# Patient Record
Sex: MALE | Race: WHITE
[De-identification: names, ages, dates, MRNs, and addresses within clinical notes are randomized per-mention and may not be internally consistent; named-entity substitution may affect disease eponyms.]

---

## 2019-02-18 ENCOUNTER — HOSPITAL ENCOUNTER (OUTPATIENT)
Dept: HOSPITAL 84 - D.US | Age: 69
Discharge: HOME | End: 2019-02-18
Attending: GENERAL PRACTICE
Payer: MEDICARE

## 2019-02-18 DIAGNOSIS — E04.1: Primary | ICD-10-CM

## 2021-03-29 ENCOUNTER — HOSPITAL ENCOUNTER (INPATIENT)
Dept: HOSPITAL 84 - D.ER | Age: 71
LOS: 17 days | Discharge: NURSING, REHAB OR LONG-TERM CARE | DRG: 981 | End: 2021-04-15
Attending: FAMILY MEDICINE | Admitting: FAMILY MEDICINE
Payer: MEDICARE

## 2021-03-29 VITALS
BODY MASS INDEX: 20.5 KG/M2 | HEIGHT: 72 IN | WEIGHT: 151.32 LBS | WEIGHT: 151.32 LBS | BODY MASS INDEX: 20.5 KG/M2 | BODY MASS INDEX: 20.5 KG/M2 | BODY MASS INDEX: 20.5 KG/M2 | HEIGHT: 72 IN

## 2021-03-29 VITALS — SYSTOLIC BLOOD PRESSURE: 145 MMHG | DIASTOLIC BLOOD PRESSURE: 86 MMHG

## 2021-03-29 DIAGNOSIS — R53.1: ICD-10-CM

## 2021-03-29 DIAGNOSIS — N17.9: ICD-10-CM

## 2021-03-29 DIAGNOSIS — D68.318: ICD-10-CM

## 2021-03-29 DIAGNOSIS — I65.29: ICD-10-CM

## 2021-03-29 DIAGNOSIS — K29.00: ICD-10-CM

## 2021-03-29 DIAGNOSIS — E03.9: ICD-10-CM

## 2021-03-29 DIAGNOSIS — N40.0: ICD-10-CM

## 2021-03-29 DIAGNOSIS — G81.94: ICD-10-CM

## 2021-03-29 DIAGNOSIS — K62.5: ICD-10-CM

## 2021-03-29 DIAGNOSIS — I69.322: ICD-10-CM

## 2021-03-29 DIAGNOSIS — I10: ICD-10-CM

## 2021-03-29 DIAGNOSIS — I21.4: ICD-10-CM

## 2021-03-29 DIAGNOSIS — I63.511: Primary | ICD-10-CM

## 2021-03-29 DIAGNOSIS — G92: ICD-10-CM

## 2021-03-29 DIAGNOSIS — I69.318: ICD-10-CM

## 2021-03-29 DIAGNOSIS — T43.625A: ICD-10-CM

## 2021-03-29 DIAGNOSIS — F01.51: ICD-10-CM

## 2021-03-29 LAB
ALBUMIN SERPL-MCNC: 3.9 G/DL (ref 3.4–5)
ALP SERPL-CCNC: 138 U/L (ref 30–120)
ALT SERPL-CCNC: 17 U/L (ref 10–68)
AMPHETAMINES UR QL SCN: POSITIVE QUAL
ANION GAP SERPL CALC-SCNC: 10.3 MMOL/L (ref 8–16)
APTT BLD: 24.3 SECONDS (ref 22.8–39.4)
BACTERIA #/AREA URNS HPF: (no result) HPF
BARBITURATES UR QL SCN: NEGATIVE QUAL
BASOPHILS NFR BLD AUTO: 0.6 % (ref 0–2)
BENZODIAZ UR QL SCN: NEGATIVE QUAL
BILIRUB SERPL-MCNC: 0.66 MG/DL (ref 0.2–1.3)
BILIRUB SERPL-MCNC: NEGATIVE MG/DL
BUN SERPL-MCNC: 15 MG/DL (ref 7–18)
BZE UR QL SCN: NEGATIVE QUAL
CALCIUM SERPL-MCNC: 9 MG/DL (ref 8.5–10.1)
CANNABINOIDS UR QL SCN: NEGATIVE QUAL
CHLORIDE SERPL-SCNC: 102 MMOL/L (ref 98–107)
CK MB SERPL-MCNC: 2 U/L (ref 0–3.6)
CK SERPL-CCNC: 132 UL (ref 21–232)
CO2 SERPL-SCNC: 28.8 MMOL/L (ref 21–32)
CREAT SERPL-MCNC: 1.4 MG/DL (ref 0.6–1.3)
CRP SERPL-MCNC: 0 MG/DL (ref 0–0.9)
EOSINOPHIL NFR BLD: 1.2 % (ref 0–7)
ERYTHROCYTE [DISTWIDTH] IN BLOOD BY AUTOMATED COUNT: 13.6 % (ref 11.5–14.5)
GLOBULIN SER-MCNC: 4.6 G/L
GLUCOSE SERPL-MCNC: 106 MG/DL (ref 74–106)
HCT VFR BLD CALC: 40.4 % (ref 42–54)
HGB BLD-MCNC: 14 G/DL (ref 13.5–17.5)
IMM GRANULOCYTES NFR BLD: 0.2 % (ref 0–5)
INR PPP: 1.13 (ref 0.85–1.17)
KETONES UR STRIP-MCNC: (no result) MG/DL
LIPASE SERPL-CCNC: 71 U/L (ref 73–393)
LYMPHOCYTES # BLD: 0.73 10X3/UL (ref 1.32–3.57)
LYMPHOCYTES NFR BLD AUTO: 14.6 % (ref 15–50)
MAGNESIUM SERPL-MCNC: 2.3 MG/DL (ref 1.8–2.4)
MCH RBC QN AUTO: 29.3 PG (ref 26–34)
MCHC RBC AUTO-ENTMCNC: 34.7 G/DL (ref 31–37)
MCV RBC: 84.5 FL (ref 80–100)
MONOCYTES NFR BLD: 10.8 % (ref 2–11)
NEUTROPHILS # BLD: 3.62 10X3/UL (ref 1.78–5.38)
NEUTROPHILS NFR BLD AUTO: 72.6 % (ref 40–80)
NITRITE UR-MCNC: NEGATIVE MG/ML
NT-PROBNP SERPL-MCNC: 2002 PG/ML (ref 0–125)
OPIATES UR QL SCN: NEGATIVE QUAL
OSMOLALITY SERPL CALC.SUM OF ELEC: 274 MOSM/KG (ref 275–300)
PCP UR QL SCN: NEGATIVE QUAL
PH UR STRIP: 7 [PH] (ref 5–8)
PLATELET # BLD: 259 10X3/UL (ref 130–400)
PMV BLD AUTO: 9.4 FL (ref 7.4–10.4)
POTASSIUM SERPL-SCNC: 4.1 MMOL/L (ref 3.5–5.1)
PROT SERPL-MCNC: 8.5 G/DL (ref 6.4–8.2)
PROTHROMBIN TIME: 13.4 SECONDS (ref 11.6–15)
RBC # BLD AUTO: 4.78 10X6/UL (ref 4.2–6.1)
SODIUM SERPL-SCNC: 137 MMOL/L (ref 136–145)
SQUAMOUS #/AREA URNS HPF: (no result) HPF (ref 0–4)
TROPONIN I SERPL-MCNC: < 0.017 NG/ML (ref 0–0.06)
TSH SERPL-ACNC: 1.98 UIU/ML (ref 0.36–3.74)
UROBILINOGEN UR-MCNC: NORMAL MG/DL (ref ?–2)
WBC # BLD AUTO: 5 10X3/UL (ref 4.8–10.8)
WBC #/AREA URNS HPF: (no result) HPF (ref 0–1)

## 2021-03-29 NOTE — HEMODYNAMI
PATIENT:CHRIS HAYDEN                                      MEDICAL RECORD: [id number]
: 54                                            LOCATION:[facility]     ANYA.2214
ACCT# [id number]                                       ADMISSION DATE: 21
 
 
 Generatedon:3/31/426656:00
Patient name: CHRIS HAYDEN Patient #: [id number] Visit #: [id number] SSN: : 
1950 Date of study:
3/31/2021
Page: Of
Hemodynamic Procedure Report
****************************
Patient Data
Patient Demographics
Procedure consent was obtained
First Name: CHRIS           Gender: Male
Last Name: JACKELYN            : 1950
Patient #: [id number]       Age: 70 year(s)
Visit #: [id number]       Race: 
Accession #:
[id number]
Additional ID: [id number]
Contact details
Address: [place] [address]         Phone: [phone]
State: [place]
City: [place]
Zip code: [address]
Admission
Admission Data
Admission Date: 3/29/2021   Admission Time: 20:50
Arrival Date: 3/29/2021     Arrival Time: 20:50
Admit Source: Emergency     Insurance Payor: Medicare
department                  [facility] #: [id number]
Room #: D.2214
Height (in.): 71.65         BSA: 2 (m2)
Height (cm.): 182           BMI: 23.85 (kg/m2)
Weight (lbs.): 174.17
Weight (kg.): 79
Lab Results
Lab Result Date: 3/31/2021  Lab Result Time: 0:00
Biochemistry
Name         Units    Result                Min      Max
BUN          mg/dl    24       --(----)-*   7        18
Creatinine   mg/dl    1.3      --(---*)--   0.6      1.3
eGFR         ml/min   58       *-(----)--   90       120
NONAFRICAN
CBC
Name         Units    Result                Min      Max
Hemoglobin   g/dl     12.9     -*(----)--   13.5     17.5
Procedure
Procedure Types
Cath Procedure
Diagnostic Procedure
LHC
LH w/Coronaries
Sedation Charges
Moderate Sedation 25-39 minutes
PCI Procedure
Coronary Stent
 
Coronary Stent Initial x2
Hemochron ACT Test
Procedure Description
Procedure Date
Procedure Date: 3/31/2021
Procedure Start Time: 10:23
Procedure End Time: 10:55
Procedure Staff
Name                            Function
Oli Luevano MD              Performing Physician
Candida Herzog RT                    Monitor
Alycia Wen RT                Scrub
Calvin Melendez RN                  Nurse
Procedure Data
Cath Procedure
Fluoroscopy
Diagnostic fluoroscopy      Total fluoroscopy Time: 9.9
time: 9.9 min               min
Diagnostic fluoroscopy      Total fluoroscopy dose:
dose: 1033 mGy              1033 mGy
Contrast Material
Contrast Material Type                       Amount (ml)
Isovue 300                                   178
Entry Location
Entry     Primary  Successful  Side  Size  Upsize Upsize Entry    Closure     Sánchez
ccessful  Closure
Location                             (Fr)  1 (Fr) 2 (Fr) Remarks  Device        
          Remarks
Radial                         Right 6 Fr                         Mechanical
artery                               Short                        Compression
Estimated blood loss: 5 ml
Diagnostic catheters
Device Type               Used For           End Catheter
Placement
DIAGNOSTIC [place] 110cm 5  Multi-vessel
Fr catheter ([id number])      Angiography
Procedure Complications
No complications
Procedure Medications
Medication           Administration Route Dosage
Oxygen               etCO2 Nasal cannula  2 l/min
Lidocaine 2%                              20
Heparin Flush Bag    added to field       2 bags
(1000units/500ml NS)
0.9% NaCl            I.V.                 100 ml/hr
Versed               I.V.                 0.5 mg
Fentanyl             I.V.                 25 mcg
Radial Cocktail      I.A.                 1 syringe
(Verapamil 2mg/Nitro
400mcg/Heparin
1500units)
Versed               I.V.                 0.5 mg
Fentanyl             I.V.                 25 mcg
Heparin Bolus        I.V.                 5000 units
Integrilin (Bolus    I.V.                 7.3 ml
2mg/ml)
Integrilin Drip      I.V. drip            12.6 ml/hr
(75mg/100ml)
Vasotec                                   2.5 mg
Hemodynamics
 
Rest
BSA: 2 (m2) HGB: 12.9 (g/dl) O2 Consumption: Estimated: 236.92 (ml/min) O2 Consu
mption indexed:
Estimated:118.46 (ml/min/m) Heart Rate: 76 (bpm)
Pressure Samples
Time     Site     Value (mmHg) Purpose      Heart      Use
Rate(bpm)
10:25    LV       100/8,10     Snapshot     72
10:26    AO       96/66(79)    Pullback     70
10:26    LV       96/5,10      Pullback     70
Gradients
Valve  Time  Site 1  Site 2    Mean    SEP/DFP    Peak To Heart  Use
(mmHg)  (sec/min)  Peak    Rate
(mmHg)  (bpm)
Aortic 10:26 LV      AO        0       1          0       70
96/5,10 96/66(79)
Calculations
Valve    P-P      Mean      Valve     Index  Valve    Source
Name              Gradient  Area             Flow
(cm2)
Aortic   0        0
0        0
Snapshots
Pre Cath      Intra         NCS           Post Cath
Vital Signs
Time     Heart  Resp   SPO2 etCO2   NIBP (mmHg)  Rhythm  Pain    Sedation
Rate   (ipm)  (%)  (mmHg)                       Status  Level
(bpm)
10:15:35 77     14     98   1.5     154/98(123)  NSR     0 (11)  8(A)
, No
pain
10:19:53 76     13     99   0.7     151/93(121)  NSR     0 (11)  8(A)
, No
pain
10:24:11 75     14     99   1.5     139/82(109)  NSR     0 (11)  8(A)
, No
pain
10:28:23 79     13     98   12.1    124/81(95)   NSR     0 (11)  8(A)
, No
pain
10:32:35 76     12     98   13.6    135/84(106)  NSR     0 (11)  8(A)
, No
pain
10:36:51 77     14     99   18.9    146/88(110)  NSR     0 (11)  8(A)
, No
pain
10:41:07 74     13     99   15.9    152/91(122)  NSR     0 (11)  8(A)
, No
pain
10:45:27 73     13     99   1.5     156/90(126)  NSR     0 (11)  8(A)
, No
pain
10:49:49 74     14     99   9.8     165/98(127)  NSR     0 (11)  8(A)
, No
pain
10:54:15 75     16     100  9.1     170/105(143) NSR     0 (11)  8(A)
, No
pain
Medications
Time     Medication       Route   Dose    Verified Delivered Reason          Not
 
es        Effectiveness
by       by
10:15:28 Oxygen           etCO2   2 l/min Oli Simpson    used for
Nasal           St Samson Melendez RN   procedure
cannula         MD
10:16:06 Lidocaine 2%             20ml    Oli Baird   for local
vial    [facility]   anesthetic
MD CASTILLO
10:16:12 Heparin Flush    added   2 bags  Oli Baird   used for
Bag              to              [facility]   procedure
(1000units/500ml field MD CASTILLO
NS)
10:16:21 0.9% NaCl        I.V.    100     Oli Simpson    Per physician
ml/hr   St Samson Melendez RN, MD
10:16:42 Versed           I.V.    0.5 mg  Oli Simpson    for sedation
St Samson Melendez RN, MD
10:16:53 Fentanyl         I.V.    25 mcg  Oli Simpson    for sedation
St Samson Melendez RN, MD
10:23:28 Radial Cocktail  I.A.    1       Oli Baird   for
(Verapamil               syringe ChloéSamson Luevano   vasodilation
2mg/Nitro                        MD CASTILLO
400mcg/Heparin
1500units)
10:23:40 Versed           I.V.    0.5 mg  Oli Baird   for sedation
St Samson Davies MD, MD
10:23:43 Fentanyl         I.V.    25 mcg  Oli Baird   for sedation
St Samson Davies MD, MD
10:31:22 Heparin Bolus    I.V.    5000    Oli Simpson    for             lauren
ified
units   St Samson Melendez RN   anticoagulation with dr MD hinojosa
10:33:03 Integrilin       I.V.    7.3 ml  Oli Simpson    for             was
irwin 2.7
(Bolus 2mg/ml)                   St Samson Melendez RN   antiplatelet    ml of vial
MD                 therapy
10:51:26 Integrilin Drip  I.V.    12.6    Oli laguna             To 
be
(75mg/100ml)     drip    ml/hr   St Samson Melendez RN   antiplatelet    infusing
MD                 therapy         until
cleared to
take
antiplatelet
by mouth.
10:51:55 Vasotec          IV      2.5 mg  Oli iSmpson    Per physician
St Samson Melendez RN, MD
Procedure Log
Time     Note
9:41:27  Diagnostic Cath Status : Urgent
9:42:00  Informed consent obtained and on chart
9:42:12  Admit Source: Emergency department
9:42:17  Arrival Date: 3/29/2021 8:50:00 PM
9:42:43  Insurance Payor : Medicare
9:42:46  Patient Height : 71.65 inches
9:42:50  Patient Weight : 174.17 lbs
 
9:43:44  Lab Result : eGFR NONAFRICAN 58 ml/min
9:43:44  Lab Result : Creatinine 1.3 mg/dl
9:43:44  Lab Result : BUN 24 mg/dl
9:43:44  Lab Result : Hemoglobin 12.9 g/dl
9:43:52  Procedure Status Urgent Heart Cath (IP).
9:43:58  Calvin Melendez RN sent for patient. Start room use.
9:44:00  Time tracking: Regular hours (M-F 7:00 - 5:00)
9:44:04  Plan of Care:Hemodynamics will remain stable., Cardiac
rhythm will remain stable., Comfort level will be
maintained., Respiratory function will remain
adequate., Patient/ family verbilizes understanding of
procedure., Procedure tolerated without complication.,
Recovers from procedure without complications..
10:01:37 Patient received from Med/Surg to CCL 1 Alert and
oriented. Tansferred to table in Supine position.
10:01:38 Warm blankets applied, and yousuf hugger turned on for
patient comfort.
10:01:39 Correct patient and procedure confirmed by team.
10:01:39 ECG and BP/O2 sat monitors applied to patient.
10:14:27 Baseline sample Acquired.
10:14:27 Vital chart was started
10:14:38 Full Disclosure recording started
10:14:42 H&P Date Dictated: 3/31/2021 New H&P dictated by
physician..
10:14:44 Pre-procedure instructions explained to patient.
10:14:44 Pre-op teaching completed and patient verbalized
understanding.
10:14:48 Family in patients room.
10:14:49 Patient NPO since Midnight.
10:15:28 Oxygen 2 l/min etCO2 Nasal cannula was administered by
Calvin Melendez RN; used for procedure; Verbal order read
back and verified.
10:15:36 Is the patient allergic to Iodine/contrast media? No.
10:15:36 Was the patient premedicated? Yes
10:15:37 Is patient on blood thinner?No
10:15:39 Patient diabetic? Unknown.
10:15:43 Previous problem with sedation/anesthesia? Unknown ?
10:15:45 Snore? Unknown
10:15:47 Sleep apnea? Unknown
10:15:48 Deviated septum? Unknown
10:15:52 Opens mouth fully? Unknown
10:15:53 Sticks out tongue? Unknown
10:15:55 Airway obstruction? Unknown ?
10:15:58 Dentures? Unknown ?
10:16:03 Pre procedure: right dorsailis pedis pulse 1+
Palpable, but thready & weak; easily obliterated
10:16:06 Lidocaine 2% 20ml vial was administered by Oli Luevano MD; for local anesthetic; Verbal order read back
and verified.
10:16:06 Pre procedure: left dorsailis pedis pulse 1+ Palpable,
but thready & weak; easily obliterated
10:16:08 Patient pain scale 0/10 ?.
10:16:12 Heparin Flush Bag (1000units/500ml NS) 2 bags added to
field was administered by Oli Luevano MD; used for
procedure; Verbal order read back and verified.
10:16:14 IV patent on arrival in right forearm with 0.9% NaCl
at [facility].
10:16:15 Lab results completed and on chart.
10:16:20 Right Radial & Right Groin area was prepped with
chlora-prep and draped in sterile fashion
 
10:16:21 0.9% NaCl 100 ml/hr I.V. was administered by Calvin Melendez RN; Per physician; Verbal order read back and
verified.
10:16:21 Alarms reviewed by R. N.
10:16:22 Sharps counted by scrub and verified by R.N.
10:16:23 Physician arrived
10:16:23 Final Timeout: patient, procedure, and site verified
with staff and physician. All members of the team are
in agreement.
10:16:24 --------ALL STOP TIME OUT------
10:16:26 Right Radial & Right Groin site verified by team.
10:16:30 Fire Safety Assessment: A--An alcohol-based skin
anteseptic being used preoperatively., C--Open oxygen
or nitrous oxide is being used., D--An ESU, laser, or
fiber-optic light is being used.
10:16:34 Physical assessment completed. ASA score P 3 - A
patient with severe systemic disease as per Oli Luevano MD.
10:16:42 Versed 0.5 mg I.V. was administered by Calvin Melendez RN;
for sedation; Verbal order read back and verified.
10:16:53 Fentanyl 25 mcg I.V. was administered by Calvin Melendez RN; for sedation; Verbal order read back and verified.
10:17:13 3a) 45-59 Moderately reduced kidney function.
10:17:16 Maximum allowable contrast dose (3.7 X eGFR X 0.75)160
ml.
10:17:20 Sedation plan: IV Moderate Sedation Medication:Versed,
Fentanyl
10:17:30 Use device set Radial Dx or PCI
10:17:31 ACIST Syringe (91320) opened to sterile field.
10:17:31 Medline Cath Pack ([id number]) opened to sterile field.
10:17:32 Bag Decanter (S) opened to sterile field.
10:17:32 ACIST Hand Control (77602) opened to sterile field.
10:17:33 ACIST Manifold (81677) opened to sterile field.
10:17:34 Tegaderm 4 x 4 (1626W) opened to sterile field.
10:17:35 MBrace Wrist Support ([id number]) opened to sterile
field.
10:17:36 SHEATH 6FR RAIN ([id number]) opened to sterile field.
10:17:37 EMERALD Guide Wire (768-198) opened to sterile field.
10:18:23 Family of pt was in room upon transfer to cath lab. Pt
was non responisive to verbal and is a fall risk.
10:23:28 Radial Cocktail (Verapamil 2mg/Nitro 400mcg/Heparin
1500units) 1 syringe I.A. was administered by Oli Luevano MD; for vasodilation; Verbal order read back
and verified.
10:23:39 Procedure started.
10:23:40 Versed 0.5 mg I.V. was administered by Oli Luevano MD; for sedation; Verbal order read back and verified.
10:23:42 Local anesthetic to right radial artery with Lidocaine
2% by Oli Luevano MD.**INITIAL ACCESS ONLY**
10:23:43 Fentanyl 25 mcg I.V. was administered by Oli Luevano MD; for sedation; Verbal order read back and
verified.
10:23:51 A 6 Fr Short sheath was inserted into the Right Radial
artery
10:24:00 A DIAGNOSTIC Tiger 110cm 5 Fr catheter ([id number]) was
advanced over the wire and used for Multi-vessel
Angiography.
10:25:46 LV hemodynamics recorded.
10:25:47 LV gram done using SARKAR
10:25:49 Injector settings: Ml/sec: 5, Volume: 15,
 
10:25:56 EF : 55 %
10:25:59 RCA angiography performed.
10:26:01 Injector settings: Ml/sec: 3, Volume: 6,
10:26:35 LCA angiography performed.
10:26:38 Injector settings: Ml/sec: 3, Volume: 6,
10:28:55 Catheter removed.
10:29:44 Asahi Minamo 300cm wire opened to sterile field.
10:29:45 INFLATOR Merit BasixCompak (VC4293) opened to sterile
field.
10:29:45 GUIDE 6FR XBLAD 3.5 catheter ([id number]) opened to
sterile field.
10:29:52 INFLATOR Merit BasixCompak (BY8802) opened to sterile
field.
10:31:22 Heparin Bolus 5000 units I.V. was administered by
Calvin Melendez RN; for anticoagulation; verified with dr hinojosa Verbal order read back and verified.
10:31:34 Proceeding to intervention.
10:31:42 6 Fr XBLAD 3.5 guide catheter was inserted over the
wire
10:31:46 MINAMO wire advanced.
10:33:03 Integrilin (Bolus 2mg/ml) 7.3 ml I.V. was administered
by Calvin Melendez RN; for antiplatelet therapy; wasted
2.7 ml of vial Verbal order read back and verified.
10:34:08 Wire advanced across lesion.
10:36:39 Place stent Inflation Number: 1 A INTEGRITY RX 3.5 x
15 stent ([id number]) was prepped and advanced across
the Mid CX 90. The stent was deployed at 14 ERIKA for
0:30 (min:sec) 0.
10:37:32 Stent catheter was removed intact over wire.
10:39:46 Place stent Inflation Number: 1 A INTEGRITY RX 3.5 x
15 stent ([id number]) was prepped and advanced across
the Prox CX 90. The stent was deployed at 14 ERIKA for
0:30 (min:sec) 0.
10:42:27 Stent catheter was removed intact over wire.
10:42:33 Wire redirected to LAD.
10:45:26 Inflate balloon Inflation number: 1 A EUPHORA 3.0 x 15
Balloon ([id number]) was prepped and advanced across the
Mid LAD 90, then inflated to 14 ERIKA for 0:30 (min:sec)
.
10:45:46 Inflation number: 2 The EUPHORA 3.0 x 15 Balloon
([id number]) was reinflated across the Mid LAD , to 14
ERIKA for 0:30 (min:sec) .
10:46:14 Inflation number: 3 The EUPHORA 3.0 x 15 Balloon
([id number]) was reinflated across the Mid LAD , to 14
ERIKA for 0:30 (min:sec) .
10:46:35 Inflation number: 1 The EUPHORA 3.0 x 15 Balloon
([id number]) was reinflated across the Prox LAD 90, to
14 ERIKA for 0:30 (min:sec) .
10:47:09 Inflation number: 2 The EUPHORA 3.0 x 15 Balloon
([id number]) was reinflated across the Prox LAD , to 14
ERIKA for 0:30 (min:sec) .
10:47:49 Inflation number: 3 The EUPHORA 3.0 x 15 Balloon
([id number]) was reinflated across the Prox LAD , to 16
ERIKA for 0:30 (min:sec) .
10:48:28 Balloon removed over the wire.
10:51:26 Integrilin Drip (75mg/100ml) 12.6 ml/hr I.V. drip was
administered by Calvin Melendez RN; for antiplatelet
therapy; To be infusing until cleared to take
antiplatelet by mouth. Verbal order read back and
verified.
 
10:51:35 Place stent Inflation Number: 4 A INTEGRITY RX 3.0 x
18 stent ([id number]) was prepped and advanced across
the Mid LAD 90. The stent was deployed at 16 ERIKA for
0:30 (min:sec) 0.
10:51:51 Stent catheter was removed intact over wire.
10:51:53 Wire removed.
10:51:54 Guide catheter removed.
10:51:55 Vasotec 2.5 mg IV was administered by Calvin Melendez RN;
Per physician; Verbal order read back and verified.
10:52:05 ZEPHYR REGULAR TR BAND ([id number]) opened to sterile
field.
10:52:30 Sheath removed intact; hemostasis achieved with
Mechanical Compression to the Right Radial artery.
10:52:35 Procedure ended.(Physican Out)
10:53:01 Fluoroscopy time 09.90 minutes.
10:53:05 Flurop Dose total: 1033
10:53:05 Fluoroscopy dose: 1033 mGy
10:53:10 Dose Area Product 58704 mGy/cm.
10:53:14 Contrast amount:Isovue 300 178ml.
10:53:17 Maximum allowable dose exceeded? Yes.
10:53:20 Pickens band inflated with 12cc of air.
10:53:27 Post right radial artery:stable
10:53:28 Post Procedure Pulses reassessed and unchanged
10:53:31 Post procedure rhythm: unchanged.
10:53:34 Estimated blood loss: 5 ml
10:53:36 Post procedure instruction explained to
patient.Patient verbalizes understanding.
10:53:36 Patient needs reinforcement of post procedure
teaching.
10:55:19 Procedure type changed to Cath procedure, Diagnostic
procedure, LHC, [facility] w/Coronaries, Sedation Charges,
Moderate Sedation 25-39 minutes, PCI procedure,
Coronary Stent, Coronary Stent Initial x2, Hemochron
ACT Test
10:55:21 Procedure and supply charges have been captured,
reviewed, submitted and are correct.
10:55:25 Procedure Complication : No complications
10:55:28 Vital chart was stopped
10:55:29 [facility] Findings: MVD- PCI performed (see procedure note)
10:55:32 Operative report dictated upon procedure completion.
10:55:32 See physician's report for complete and final results.
10:55:42 Report given to Med/Surg.
10:55:45 Patient transfered to Med/Surg with Stretcher.
10:55:47 Procedure ended.
10:55:47 Full Disclosure recording stopped
10:55:57 End room use (Document Last)
10:59:43 ACT drawn and resulted at out of range high seconds.
(normal therapeutic range 180-240 seconds).
Intervention Summary
Intervention Notes
Time     ActionType  Lesion and  Equipment    Action#  Pressure  Duration
Attributes  Used
10:36:39 Place stent Mid CX      INTEGRITY RX 1        14        00:30
3.5 x 15
stent
([id number])
10:39:46 Place stent Prox CX     INTEGRITY RX 1        14        00:30
3.5 x 15
stent
([id number])
 
10:45:26 Inflate     Mid LAD     EUPHORA 3.0  1        14        00:30
balloon                 x 15 Balloon
([id number])
10:45:46 Reinflate   Mid LAD     EUPHORA 3.0  2        14        00:30
balloon                 x 15 Balloon
([id number])
10:46:14 Reinflate   Mid LAD     EUPHORA 3.0  3        14        00:30
balloon                 x 15 Balloon
([id number])
10:46:35 Reinflate   Prox LAD    EUPHORA 3.0  1        14        00:30
balloon                 x 15 Balloon
([id number])
10:47:09 Reinflate   Prox LAD    EUPHORA 3.0  2        14        00:30
balloon                 x 15 Balloon
([id number])
10:47:49 Reinflate   Prox LAD    EUPHORA 3.0  3        16        00:30
balloon                 x 15 Balloon
([id number])
10:51:35 Place stent Mid LAD     INTEGRITY RX 4        16        00:30
3.0 x 18
stent
([id number])
Device Usage
Item Name    Manufacture  Quantity  Catalog      Hospital Part     Current Minim
al Lot# /
Number       Charge   Number   Stock   Stock   Serial#
Code
ACIST        Acist        1         25102        924252   065174   947349  20
Syringe      Medical
(47958)      Systems Inc
Medline Cath Medline      1         [id number]    933202   69465    974826  5
Pack
([id number])
Bag Decanter Microtek     1         S        594943   05590    449502  5
(S)      Medical Inc.
ACIST Hand   Acist        1         13620        964522   922471   990100  5
Control      Medical
(97570)      Systems Inc
ACIST        Acist        1         88961        267123   252930   382051  5
Manifold     Medical
(78504)      Systems Inc
Tegaderm 4 x 3M           1         1626W        347909   743206   308219  5
4 (1626W)
MBrace Wrist Advanced     1         [phone]  299533   96428    448356  5
Support      Vascular
(810733655)  Dynamics
SHEATH 6FR   Cardinal     1         8712953      203109   1001651  506843  5
[facility]
(8233711)
EMERALD      Cardinal     1         729-881      103405   826045   634722  5
Guide Wire   [facility]
(875-340)
DIAGNOSTIC   Terumo       1               637578   868188   721841  5
[place] 110cm
5 Fr
catheter
(716133)
[facility] Intecc 1         [id number]    333986   0047767  688418  0
300cm wire
INFLATOR     Merit        2         [id number]       738584   483342   336509  15
 
Merit        Medical
BasixCompak
(YT7194)
GUIDE 6FR    Cardinal     1         [id number]     150792   354884   674878  10
XBLAD 3.5    Health
catheter
(25875579)
INTEGRITY RX Medtronic    2         [id number]   489413   002065   077935  5    
   [id number]
3.5 x 15                                                                        
   [id number]
stent
([id number])
EUPHORA 3.0  Medtronic    1         [id number]     324638   832433   230770  5    
   [id number]
x 15 Balloon
([id number])
INTEGRITY RX Medtronic    1         [id number]   555416   842643   828150  5    
   [id number]
3.0 x 18
stent
([id number])
ZEPHYR       Cardinal     1         394015       656755   8237976  722220  5
REGULAR TR   Health
BAND
(734254)
Signature Audit [place]
Stage           Time        Signature      Unsigned
Intra-Procedure 3/31/2021   Candida Herzog
10:57:12 AM RT(R)
Intra-Procedure 3/31/2021   Calvin Melendez RN
10:57:30 AM
Intra-Procedure 3/31/2021   Oli Nuñez
11:00:23 AM Samson CASTILLO
 
 
 
 
 
 
 
 
 
 
 
 
 
 
 
 
 
 
 
 
 
[facility]                                          
[address] [id number]

## 2021-03-30 VITALS — SYSTOLIC BLOOD PRESSURE: 158 MMHG | DIASTOLIC BLOOD PRESSURE: 89 MMHG

## 2021-03-30 VITALS — SYSTOLIC BLOOD PRESSURE: 144 MMHG | DIASTOLIC BLOOD PRESSURE: 76 MMHG

## 2021-03-30 VITALS — DIASTOLIC BLOOD PRESSURE: 100 MMHG | SYSTOLIC BLOOD PRESSURE: 221 MMHG

## 2021-03-30 VITALS — DIASTOLIC BLOOD PRESSURE: 78 MMHG | SYSTOLIC BLOOD PRESSURE: 131 MMHG

## 2021-03-30 VITALS — DIASTOLIC BLOOD PRESSURE: 61 MMHG | SYSTOLIC BLOOD PRESSURE: 116 MMHG

## 2021-03-30 VITALS — SYSTOLIC BLOOD PRESSURE: 187 MMHG | DIASTOLIC BLOOD PRESSURE: 97 MMHG

## 2021-03-30 LAB
ANION GAP SERPL CALC-SCNC: 13.9 MMOL/L (ref 8–16)
BASOPHILS NFR BLD AUTO: 0.8 % (ref 0–2)
BUN SERPL-MCNC: 16 MG/DL (ref 7–18)
CALCIUM SERPL-MCNC: 8.8 MG/DL (ref 8.5–10.1)
CHLORIDE SERPL-SCNC: 104 MMOL/L (ref 98–107)
CHOLEST/HDLC SERPL: 3.3 RATIO (ref 2.3–4.9)
CK MB SERPL-MCNC: 16.9 U/L (ref 0–3.6)
CK MB SERPL-MCNC: 6.4 U/L (ref 0–3.6)
CK SERPL-CCNC: 140 UL (ref 21–232)
CK SERPL-CCNC: 205 UL (ref 21–232)
CO2 SERPL-SCNC: 24.9 MMOL/L (ref 21–32)
CREAT SERPL-MCNC: 1.2 MG/DL (ref 0.6–1.3)
EOSINOPHIL NFR BLD: 1.4 % (ref 0–7)
ERYTHROCYTE [DISTWIDTH] IN BLOOD BY AUTOMATED COUNT: 13.5 % (ref 11.5–14.5)
GLUCOSE SERPL-MCNC: 91 MG/DL (ref 74–106)
HCT VFR BLD CALC: 37.9 % (ref 42–54)
HDLC SERPL-MCNC: 50 MG/DL (ref 32–96)
HGB BLD-MCNC: 12.9 G/DL (ref 13.5–17.5)
IMM GRANULOCYTES NFR BLD: 0.2 % (ref 0–5)
LDL-HDL RATIO: 2.1 RATIO (ref 1.5–3.5)
LDLC SERPL-MCNC: 104 MG/DL (ref 0–100)
LYMPHOCYTES # BLD: 0.86 10X3/UL (ref 1.32–3.57)
LYMPHOCYTES NFR BLD AUTO: 17.1 % (ref 15–50)
MAGNESIUM SERPL-MCNC: 2.2 MG/DL (ref 1.8–2.4)
MCH RBC QN AUTO: 28.9 PG (ref 26–34)
MCHC RBC AUTO-ENTMCNC: 34 G/DL (ref 31–37)
MCV RBC: 85 FL (ref 80–100)
MONOCYTES NFR BLD: 13.5 % (ref 2–11)
NEUTROPHILS # BLD: 3.37 10X3/UL (ref 1.78–5.38)
NEUTROPHILS NFR BLD AUTO: 67 % (ref 40–80)
OSMOLALITY SERPL CALC.SUM OF ELEC: 278 MOSM/KG (ref 275–300)
PHOSPHATE SERPL-MCNC: 3.6 MG/DL (ref 2.5–4.9)
PLATELET # BLD: 272 10X3/UL (ref 130–400)
PMV BLD AUTO: 9.5 FL (ref 7.4–10.4)
POTASSIUM SERPL-SCNC: 3.8 MMOL/L (ref 3.5–5.1)
RBC # BLD AUTO: 4.46 10X6/UL (ref 4.2–6.1)
SODIUM SERPL-SCNC: 139 MMOL/L (ref 136–145)
TRIGL SERPL-MCNC: 56 MG/DL (ref 30–200)
TROPONIN I SERPL-MCNC: 0.82 NG/ML (ref 0–0.06)
TROPONIN I SERPL-MCNC: 4.5 NG/ML (ref 0–0.06)
WBC # BLD AUTO: 5 10X3/UL (ref 4.8–10.8)

## 2021-03-30 NOTE — NUR
PATIENT AND SISTER UPSET THAT PATIENT HAS NOT HAD MRI AND HAS NOT SEEN SPEECH
THERAPIST. MRI WAS UNABLE TO BE PERFORMED THIS AM D/T PATIENT WOULD NOT
COOPERATE. MRI NOW HAS TO FIT PATIENT INTO SCHEDULE LATER THIS AFTERNOON D/T
NO OPEN TIME SLOTS RIGHT NOW. PATIENT AND SISTER HAVE BEEN MADE AWARE OF THIS.
SPOKE WITH RAFFAELE FROM SPEECH THERAPY WHO STATES PATIENT IS ON HIS LIST TO BE
SEEN BUT ALSO HAS SEVERAL OTHER PATIENT'S TO SEE. PATIENT AND SISTER MADE
AWARE.

## 2021-03-30 NOTE — NUR
NURSE RECEIVED PHONE CALL BACK FROM EDGAR FLORES. INFORMED ABOUT LAB
LEVELS. ORDERED TO KEEP NPO TONIGHT AND CARDIO WILL SEE IN THE MORNING.

## 2021-03-30 NOTE — NUR
LING NEWMAN MADE AWARE OF PATIENT /120. PRN APRESOLINE GIVEN. ORDERS
PLACED BY APN FOR HOME MEDICATIONS AND PRN MEDICATIONS.

## 2021-03-30 NOTE — NUR
REHAB PRESCREEN:
THANK Y0U FOR THE REFERRAL. PT STILL HAS PT AND ST EVALUATIONS PENDING AT
THIS TIME. MRI IS STILL PENDING AS WELL. REHAB WILL CONTINUE TO FOLLOW FOR NOW
UNTIL WE HAVE FURTHER INFORMATION THAT WILL ASSIST IN DETERMINATION.
 
BIB BURGER RN
[facility].

## 2021-03-30 NOTE — NUR
SPOKE WITH LING FERRIS ABOUT ONE TIME DOSE ATIVAN. STATES TO LEAVE ON MAR
UNTIL TIME FOR PATIENT TO GO TO MRI. GIVE BEFORE MRI.

## 2021-03-30 NOTE — NUR
ALERT AND ORIENTED TO SELF AND PLACE. ASSESSMENT COMPLETE. DENIES NEEDS.
OFFERED PATIENT EGG CRATE MATTRESS D/T PATIENT STATING BED IS UNCOMFORTABLE
AND CAN'T SLEEP. REFUSES EGG CRATE. REFUSES SCDS. BED LOW. BED ALARM ON. CALL
FARFAN AND PERSONAL ITEMS IN REACH. WILL CONTINUE TO MONITOR.

## 2021-03-30 NOTE — NUR
PATIENT REFUSING MRI. ORDER FOR ONE TIME DOSE ATIVAN PLACED BY LING FERRIS
BUT PATIENT BACK IN ROOM NOW. MRI STATES WILL CALL WHEN ATIVAN NEEDS TO BE
GIVEN.

## 2021-03-30 NOTE — NUR
NURSE SAW [facility] WENT FROM 0.816 TO 4.504. PT CURRENTLY RESTING IN BED
WITH FAMILY FRIEND AT BEDSIDE. CALLED ON CALL HOSPITALIST TO NOTIFY. WAS GIVEN
ORDER TO CONSULT CARDIOLOGY TONIGHT. PAGED ON CALL CARDIO AT [phone]. WAITING ON
RETURNED PHONE CALL.

## 2021-03-30 NOTE — NUR
ADDITION TO PREVIOUS NOTE. PATIENT'S SISTER ALSO STATING THAT PATIENT WANTS TO
LEAVE. MADE AWARE THAT IF PATIENT LEAVES, INSURANCE WILL NOT COVER STAY.

## 2021-03-30 NOTE — NUR
PATIENT'S SISTER AT DESK REQUESTING TO TALK TO HOUSE SUPERVISOR. HOUSE SUP
CALLED AND STATES WILL COME TALK WITH PATIENT AND FAMILY.

## 2021-03-30 NOTE — NUR
PATIENT'S SISTER OUT AT DESK UPSET AND WANTING TO TALK TO APN. LING FERRIS ON
FLOOD MADE AWARE AND STATES THAT SHE WENT IN ROOM ALREADY AND BOTH PATIENT AND
SISTER WERE SLEEPING SO SHE WILL HAVE TO GO BACK AND SEE THEM WHEN SHE CAN.
PATIENT'S SISTER MADE AWARE.

## 2021-03-30 NOTE — NUR
PATIENT ASSISTED UP TO CHAIR D/T HURTING. PATIENT STATES WANTS ATIVAN NOW.
CANNOT HAVE ATIVAN UNTIL TIME FOR MRI. STATES "YALL AREN'T DOING ANYTHING TO
HELP ME. I'VE GOTTEN WORSE SINCE I GOT HERE." ABLE TO GET PATIENT TO TAKE
LISINOPRIL BUT REFUSES ALL OTHER MEDICATIONS AT THIS TIME. WILL TRY AGAIN.
PATIENT'S SISTER IN ROOM. CHAIR ALARM ON.

## 2021-03-31 VITALS — SYSTOLIC BLOOD PRESSURE: 154 MMHG | DIASTOLIC BLOOD PRESSURE: 90 MMHG

## 2021-03-31 VITALS — DIASTOLIC BLOOD PRESSURE: 96 MMHG | SYSTOLIC BLOOD PRESSURE: 157 MMHG

## 2021-03-31 VITALS — SYSTOLIC BLOOD PRESSURE: 210 MMHG | DIASTOLIC BLOOD PRESSURE: 104 MMHG

## 2021-03-31 VITALS — DIASTOLIC BLOOD PRESSURE: 73 MMHG | SYSTOLIC BLOOD PRESSURE: 148 MMHG

## 2021-03-31 VITALS — DIASTOLIC BLOOD PRESSURE: 111 MMHG | SYSTOLIC BLOOD PRESSURE: 181 MMHG

## 2021-03-31 VITALS — SYSTOLIC BLOOD PRESSURE: 179 MMHG | DIASTOLIC BLOOD PRESSURE: 98 MMHG

## 2021-03-31 VITALS — SYSTOLIC BLOOD PRESSURE: 148 MMHG | DIASTOLIC BLOOD PRESSURE: 79 MMHG

## 2021-03-31 VITALS — SYSTOLIC BLOOD PRESSURE: 161 MMHG | DIASTOLIC BLOOD PRESSURE: 94 MMHG

## 2021-03-31 VITALS — DIASTOLIC BLOOD PRESSURE: 78 MMHG | SYSTOLIC BLOOD PRESSURE: 158 MMHG

## 2021-03-31 VITALS — DIASTOLIC BLOOD PRESSURE: 79 MMHG | SYSTOLIC BLOOD PRESSURE: 132 MMHG

## 2021-03-31 VITALS — SYSTOLIC BLOOD PRESSURE: 188 MMHG | DIASTOLIC BLOOD PRESSURE: 90 MMHG

## 2021-03-31 VITALS — DIASTOLIC BLOOD PRESSURE: 112 MMHG | SYSTOLIC BLOOD PRESSURE: 191 MMHG

## 2021-03-31 VITALS — DIASTOLIC BLOOD PRESSURE: 100 MMHG | SYSTOLIC BLOOD PRESSURE: 194 MMHG

## 2021-03-31 LAB
ALT SERPL-CCNC: 21 U/L (ref 10–68)
ANION GAP SERPL CALC-SCNC: 13.9 MMOL/L (ref 8–16)
BASOPHILS NFR BLD AUTO: 0.2 % (ref 0–2)
BUN SERPL-MCNC: 24 MG/DL (ref 7–18)
CALCIUM SERPL-MCNC: 8.8 MG/DL (ref 8.5–10.1)
CHLORIDE SERPL-SCNC: 105 MMOL/L (ref 98–107)
CHOLEST/HDLC SERPL: 2.9 RATIO (ref 2.3–4.9)
CK MB SERPL-MCNC: 16.1 U/L (ref 0–3.6)
CK SERPL-CCNC: 215 UL (ref 21–232)
CO2 SERPL-SCNC: 24.1 MMOL/L (ref 21–32)
CREAT SERPL-MCNC: 1.3 MG/DL (ref 0.6–1.3)
EOSINOPHIL NFR BLD: 0.4 % (ref 0–7)
ERYTHROCYTE [DISTWIDTH] IN BLOOD BY AUTOMATED COUNT: 13.7 % (ref 11.5–14.5)
GLUCOSE SERPL-MCNC: 74 MG/DL (ref 74–106)
HCT VFR BLD CALC: 38.5 % (ref 42–54)
HDLC SERPL-MCNC: 52 MG/DL (ref 32–96)
HGB BLD-MCNC: 12.9 G/DL (ref 13.5–17.5)
IMM GRANULOCYTES NFR BLD: 0.2 % (ref 0–5)
LDL-HDL RATIO: 1.8 RATIO (ref 1.5–3.5)
LDLC SERPL-MCNC: 91 MG/DL (ref 0–100)
LYMPHOCYTES # BLD: 0.51 10X3/UL (ref 1.32–3.57)
LYMPHOCYTES NFR BLD AUTO: 9.2 % (ref 15–50)
MAGNESIUM SERPL-MCNC: 2.3 MG/DL (ref 1.8–2.4)
MCH RBC QN AUTO: 28.7 PG (ref 26–34)
MCHC RBC AUTO-ENTMCNC: 33.5 G/DL (ref 31–37)
MCV RBC: 85.7 FL (ref 80–100)
MONOCYTES NFR BLD: 8 % (ref 2–11)
NEUTROPHILS # BLD: 4.53 10X3/UL (ref 1.78–5.38)
NEUTROPHILS NFR BLD AUTO: 82 % (ref 40–80)
OSMOLALITY SERPL CALC.SUM OF ELEC: 280 MOSM/KG (ref 275–300)
PHOSPHATE SERPL-MCNC: 3.5 MG/DL (ref 2.5–4.9)
PLATELET # BLD: 255 10X3/UL (ref 130–400)
PMV BLD AUTO: 9.4 FL (ref 7.4–10.4)
POTASSIUM SERPL-SCNC: 4 MMOL/L (ref 3.5–5.1)
RBC # BLD AUTO: 4.49 10X6/UL (ref 4.2–6.1)
SODIUM SERPL-SCNC: 139 MMOL/L (ref 136–145)
TRIGL SERPL-MCNC: 52 MG/DL (ref 30–200)
TROPONIN I SERPL-MCNC: 3.64 NG/ML (ref 0–0.06)
WBC # BLD AUTO: 5.5 10X3/UL (ref 4.8–10.8)

## 2021-03-31 PROCEDURE — B2111ZZ FLUOROSCOPY OF MULTIPLE CORONARY ARTERIES USING LOW OSMOLAR CONTRAST: ICD-10-PCS | Performed by: INTERNAL MEDICINE

## 2021-03-31 PROCEDURE — 4A023N7 MEASUREMENT OF CARDIAC SAMPLING AND PRESSURE, LEFT HEART, PERCUTANEOUS APPROACH: ICD-10-PCS | Performed by: INTERNAL MEDICINE

## 2021-03-31 PROCEDURE — B2151ZZ FLUOROSCOPY OF LEFT HEART USING LOW OSMOLAR CONTRAST: ICD-10-PCS | Performed by: INTERNAL MEDICINE

## 2021-03-31 PROCEDURE — 02703DZ DILATION OF CORONARY ARTERY, ONE ARTERY WITH INTRALUMINAL DEVICE, PERCUTANEOUS APPROACH: ICD-10-PCS | Performed by: INTERNAL MEDICINE

## 2021-03-31 NOTE — NUR
PT IN BED WITH FAMILY AT BEDSIDE. PT IS SOMEWHAT AGITATED AT THIS TIME.
STATING HE WANTS SOMETHING TO DRINK. REORIENTED PT AND EXPLAINED WHY HE CAN
NOT HAVE ANY BY MOUTH AT THIS TIME. BED IN LOWEST POSITION WITH CALL LIGHT IN
REACH

## 2021-04-01 VITALS — DIASTOLIC BLOOD PRESSURE: 94 MMHG | SYSTOLIC BLOOD PRESSURE: 187 MMHG

## 2021-04-01 VITALS — SYSTOLIC BLOOD PRESSURE: 181 MMHG | DIASTOLIC BLOOD PRESSURE: 85 MMHG

## 2021-04-01 VITALS — SYSTOLIC BLOOD PRESSURE: 178 MMHG | DIASTOLIC BLOOD PRESSURE: 87 MMHG

## 2021-04-01 VITALS — DIASTOLIC BLOOD PRESSURE: 84 MMHG | SYSTOLIC BLOOD PRESSURE: 153 MMHG

## 2021-04-01 VITALS — SYSTOLIC BLOOD PRESSURE: 181 MMHG | DIASTOLIC BLOOD PRESSURE: 87 MMHG

## 2021-04-01 VITALS — DIASTOLIC BLOOD PRESSURE: 88 MMHG | SYSTOLIC BLOOD PRESSURE: 180 MMHG

## 2021-04-01 VITALS — SYSTOLIC BLOOD PRESSURE: 176 MMHG | DIASTOLIC BLOOD PRESSURE: 88 MMHG

## 2021-04-01 VITALS — DIASTOLIC BLOOD PRESSURE: 82 MMHG | SYSTOLIC BLOOD PRESSURE: 159 MMHG

## 2021-04-01 VITALS — DIASTOLIC BLOOD PRESSURE: 73 MMHG | SYSTOLIC BLOOD PRESSURE: 123 MMHG

## 2021-04-01 VITALS — DIASTOLIC BLOOD PRESSURE: 78 MMHG | SYSTOLIC BLOOD PRESSURE: 167 MMHG

## 2021-04-01 VITALS — SYSTOLIC BLOOD PRESSURE: 184 MMHG | DIASTOLIC BLOOD PRESSURE: 87 MMHG

## 2021-04-01 LAB
ANION GAP SERPL CALC-SCNC: 19.9 MMOL/L (ref 8–16)
BASOPHILS NFR BLD AUTO: 0.5 % (ref 0–2)
BUN SERPL-MCNC: 21 MG/DL (ref 7–18)
CALCIUM SERPL-MCNC: 8.6 MG/DL (ref 8.5–10.1)
CHLORIDE SERPL-SCNC: 103 MMOL/L (ref 98–107)
CO2 SERPL-SCNC: 19.7 MMOL/L (ref 21–32)
CREAT SERPL-MCNC: 1.1 MG/DL (ref 0.6–1.3)
EOSINOPHIL NFR BLD: 1.4 % (ref 0–7)
ERYTHROCYTE [DISTWIDTH] IN BLOOD BY AUTOMATED COUNT: 13.6 % (ref 11.5–14.5)
GLUCOSE SERPL-MCNC: 76 MG/DL (ref 74–106)
HCT VFR BLD CALC: 40 % (ref 42–54)
HGB BLD-MCNC: 13.3 G/DL (ref 13.5–17.5)
IMM GRANULOCYTES NFR BLD: 0.2 % (ref 0–5)
LYMPHOCYTES # BLD: 0.48 10X3/UL (ref 1.32–3.57)
LYMPHOCYTES NFR BLD AUTO: 8.2 % (ref 15–50)
MAGNESIUM SERPL-MCNC: 2.2 MG/DL (ref 1.8–2.4)
MCH RBC QN AUTO: 28.8 PG (ref 26–34)
MCHC RBC AUTO-ENTMCNC: 33.3 G/DL (ref 31–37)
MCV RBC: 86.6 FL (ref 80–100)
MONOCYTES NFR BLD: 9.9 % (ref 2–11)
NEUTROPHILS # BLD: 4.68 10X3/UL (ref 1.78–5.38)
NEUTROPHILS NFR BLD AUTO: 79.8 % (ref 40–80)
OSMOLALITY SERPL CALC.SUM OF ELEC: 279 MOSM/KG (ref 275–300)
PHOSPHATE SERPL-MCNC: 2.8 MG/DL (ref 2.5–4.9)
PLATELET # BLD: 295 10X3/UL (ref 130–400)
PMV BLD AUTO: 10.4 FL (ref 7.4–10.4)
POTASSIUM SERPL-SCNC: 3.6 MMOL/L (ref 3.5–5.1)
RBC # BLD AUTO: 4.62 10X6/UL (ref 4.2–6.1)
SODIUM SERPL-SCNC: 139 MMOL/L (ref 136–145)
WBC # BLD AUTO: 5.9 10X3/UL (ref 4.8–10.8)

## 2021-04-01 NOTE — NUR
PT SISTER AT BEDSIDE. CONSENT FOR CATH PROCEDURE SIGNED BY PT SISTER. PT
SISTER DENIES FURTHER QUESTIONS/CONCERNS/NEEDS AT THIS TIME. SIGNED CONSENT
PLACED IN PT CHART.

## 2021-04-01 NOTE — NUR
Nutrition follow-up:
Pt remains NPO after heart cath due to failed swallow evaluation per speech
path.
labs reviewed
Wt: 175#
Pt has been agitated per nurse; wants something to drink
Recommendations:
Repeat swallow evaluation
If pt does not have a functional swallow recommend NGT placement and Jevity
1.5 frederick started @ 25 ml/hr with increase to goal rate of 50 ml/hr; 25 ml H2O
flush q hour
RDN will discussed pt in IDT meeting and make above recommendations to
provider.
Follow-up: 4/5/21

## 2021-04-01 NOTE — NUR
PIV RESITE TO LEFT FA X 2 ATTEMPTS. 22G IN PLACE AND WRAPPED FOR PROTECTION.
PT WITH ELEVATED BP WITHIN PARAMETERS OF PRN APRESOLINE ADMINISTRATION. WILL
ADMINISTER PER ORDER. SEE EMAR. BED IS IN THE LOWEST POSITION. CALL LIGHT AND
BEDSIDE TABLE ARE WITHIN REAHC. SIDE RAILS X 2. PT DENIES FURTHER NEEDS. WILL
CONT TO MONITOR.

## 2021-04-01 NOTE — NUR
PT IS RESTING IN BED WITH EYES CLOSED. RESPIRATIONS ARE SHALLOW AND UNLABORED.
PT IS AROUSABLE TO VOICE AND OPENS EYES. PIV TO LEFT WRIST REMOVED BY PATIENT.
CATHETER TIP INTACT. DRESSING APPLIED. DRESSING TO LEFT ARM SKIN TEAR INPLACE
AND CDI. TELEMETRY MONITOR IS ON BUT PT REFUSES TO LEAVE ELECTRODES IN PLACE.
PT SPEECH IS GARBELED AND UNABLE TO UNDERSTAND. LUE IS FLACCID AND PT DOES NOT
MOVE EXTREMITY. LLE IS STIFF AND PT RESISTS REPOSITIONING OF EXTREMITY.
DRESSING TO LLE IN PLACE AND CDI. PT RUE AND RLE WITH APPROPRIATE ROM. PT IS
COMBATIVE WITH REPOSITIONING. PT ON PADDED MATTRESS. PT POSITIONED TO LEFT
SIDE. PILLOW USED FOR SUPPORT. HEMRRHOID NOTED AND BLEEDING DARK RED BLOOD.
PRESSURE APPLIED AND BLEEDING STOPPED. DAVIN SUNG PAGED AND NOTIFIED.
TELEPHONE ORDER RECD FOR PREPARATION H. WILL PLACE ORDER. BED IS IN THE LOWEST
POSITION. CALL LIGHT AND BEDSIDE TABLE ARE WITHIN REACH. SIDE RAILS X 2. PT
ROOM CLOSE TO NURSE STATION. WILL CONT TO MONITOR.

## 2021-04-01 NOTE — NUR
PT SISTER AT BEDSIDE AND STATES "IMPORTANT FAMILY HISTORY INFOMRATION THAT THE
DR NEED TO KNOW ABOUT". FAMILY HX INFORMATION INCLUDES ONE SISTER HAS FMD OF
CAROTID ARTERIES AND ANOTHER SISTER HAS AN ANEURYSM OF RIGHT SUBCLAVIAN
ARTERY.

## 2021-04-01 NOTE — NUR
PT TRANSPORTED FROM ROOM VIA BED ESCORTED BY THIS NURSE AND ICU RN. BEDSIDE
REPORT GIVEN. PT SISTER SENT TO ICU WAITING ROOM. ALL PERSONAL BELONGINGS WITH
SISTER.

## 2021-04-02 VITALS — DIASTOLIC BLOOD PRESSURE: 54 MMHG | SYSTOLIC BLOOD PRESSURE: 112 MMHG

## 2021-04-02 VITALS — DIASTOLIC BLOOD PRESSURE: 82 MMHG | SYSTOLIC BLOOD PRESSURE: 170 MMHG

## 2021-04-02 VITALS — SYSTOLIC BLOOD PRESSURE: 146 MMHG | DIASTOLIC BLOOD PRESSURE: 65 MMHG

## 2021-04-02 VITALS — SYSTOLIC BLOOD PRESSURE: 143 MMHG | DIASTOLIC BLOOD PRESSURE: 67 MMHG

## 2021-04-02 VITALS — DIASTOLIC BLOOD PRESSURE: 80 MMHG | SYSTOLIC BLOOD PRESSURE: 176 MMHG

## 2021-04-02 VITALS — DIASTOLIC BLOOD PRESSURE: 88 MMHG | SYSTOLIC BLOOD PRESSURE: 150 MMHG

## 2021-04-02 VITALS — DIASTOLIC BLOOD PRESSURE: 94 MMHG | SYSTOLIC BLOOD PRESSURE: 187 MMHG

## 2021-04-02 VITALS — SYSTOLIC BLOOD PRESSURE: 179 MMHG | DIASTOLIC BLOOD PRESSURE: 96 MMHG

## 2021-04-02 VITALS — SYSTOLIC BLOOD PRESSURE: 141 MMHG | DIASTOLIC BLOOD PRESSURE: 81 MMHG

## 2021-04-02 VITALS — SYSTOLIC BLOOD PRESSURE: 126 MMHG | DIASTOLIC BLOOD PRESSURE: 71 MMHG

## 2021-04-02 VITALS — SYSTOLIC BLOOD PRESSURE: 164 MMHG | DIASTOLIC BLOOD PRESSURE: 70 MMHG

## 2021-04-02 VITALS — DIASTOLIC BLOOD PRESSURE: 78 MMHG | SYSTOLIC BLOOD PRESSURE: 150 MMHG

## 2021-04-02 VITALS — SYSTOLIC BLOOD PRESSURE: 141 MMHG | DIASTOLIC BLOOD PRESSURE: 83 MMHG

## 2021-04-02 VITALS — DIASTOLIC BLOOD PRESSURE: 83 MMHG | SYSTOLIC BLOOD PRESSURE: 168 MMHG

## 2021-04-02 VITALS — DIASTOLIC BLOOD PRESSURE: 90 MMHG | SYSTOLIC BLOOD PRESSURE: 168 MMHG

## 2021-04-02 VITALS — SYSTOLIC BLOOD PRESSURE: 192 MMHG | DIASTOLIC BLOOD PRESSURE: 96 MMHG

## 2021-04-02 VITALS — SYSTOLIC BLOOD PRESSURE: 185 MMHG | DIASTOLIC BLOOD PRESSURE: 90 MMHG

## 2021-04-02 VITALS — DIASTOLIC BLOOD PRESSURE: 86 MMHG | SYSTOLIC BLOOD PRESSURE: 136 MMHG

## 2021-04-02 VITALS — DIASTOLIC BLOOD PRESSURE: 70 MMHG | SYSTOLIC BLOOD PRESSURE: 157 MMHG

## 2021-04-02 VITALS — SYSTOLIC BLOOD PRESSURE: 133 MMHG | DIASTOLIC BLOOD PRESSURE: 67 MMHG

## 2021-04-02 VITALS — SYSTOLIC BLOOD PRESSURE: 120 MMHG | DIASTOLIC BLOOD PRESSURE: 77 MMHG

## 2021-04-02 LAB
ANION GAP SERPL CALC-SCNC: 19.2 MMOL/L (ref 8–16)
BASOPHILS NFR BLD AUTO: 0.3 % (ref 0–2)
BUN SERPL-MCNC: 24 MG/DL (ref 7–18)
CALCIUM SERPL-MCNC: 8.7 MG/DL (ref 8.5–10.1)
CHLORIDE SERPL-SCNC: 104 MMOL/L (ref 98–107)
CO2 SERPL-SCNC: 22 MMOL/L (ref 21–32)
CREAT SERPL-MCNC: 1.3 MG/DL (ref 0.6–1.3)
EOSINOPHIL NFR BLD: 0.4 % (ref 0–7)
ERYTHROCYTE [DISTWIDTH] IN BLOOD BY AUTOMATED COUNT: 13.7 % (ref 11.5–14.5)
GLUCOSE SERPL-MCNC: 90 MG/DL (ref 74–106)
HCT VFR BLD CALC: 38.1 % (ref 42–54)
HGB BLD-MCNC: 12.6 G/DL (ref 13.5–17.5)
IMM GRANULOCYTES NFR BLD: 0.1 % (ref 0–5)
LYMPHOCYTES # BLD: 0.44 10X3/UL (ref 1.32–3.57)
LYMPHOCYTES NFR BLD AUTO: 5.8 % (ref 15–50)
MAGNESIUM SERPL-MCNC: 2.3 MG/DL (ref 1.8–2.4)
MCH RBC QN AUTO: 28.8 PG (ref 26–34)
MCHC RBC AUTO-ENTMCNC: 33.1 G/DL (ref 31–37)
MCV RBC: 87.2 FL (ref 80–100)
MONOCYTES NFR BLD: 9.6 % (ref 2–11)
NEUTROPHILS # BLD: 6.37 10X3/UL (ref 1.78–5.38)
NEUTROPHILS NFR BLD AUTO: 83.8 % (ref 40–80)
OSMOLALITY SERPL CALC.SUM OF ELEC: 284 MOSM/KG (ref 275–300)
PHOSPHATE SERPL-MCNC: 3.5 MG/DL (ref 2.5–4.9)
PLATELET # BLD: 340 10X3/UL (ref 130–400)
PMV BLD AUTO: 10.4 FL (ref 7.4–10.4)
POTASSIUM SERPL-SCNC: 4.2 MMOL/L (ref 3.5–5.1)
RBC # BLD AUTO: 4.37 10X6/UL (ref 4.2–6.1)
SODIUM SERPL-SCNC: 141 MMOL/L (ref 136–145)
WBC # BLD AUTO: 7.6 10X3/UL (ref 4.8–10.8)

## 2021-04-02 NOTE — OP
PATIENT NAME:  CHRIS HAYDEN                               MEDICAL RECORD: [id number]
:54                                             LOCATION:[facility]    D.2303
                                                         ADMISSION DATE:21
SURGEON:  HAKEEM MCCARTHY MD          
 
 
DATE OF OPERATION:  2021
 
PROCEDURES:  Left heart catheterization, selective coronary angiography, right
radial approach.
 
CATHETERS:  Radial sheath, Tiger catheter.
 
The procedure was well tolerated.  The patient was returned to the mills.  Sheath
removed.  TR band was placed.
 
FINDINGS:  Left ventriculography 30-degree SARKAR view, normal wall motion, normal
systolic function.
 
CORONARY ANATOMY:
LEFT MAIN:  Left main is free of disease.
LAD:  Has severe diffuse stenosis throughout its entire course, greatest being
90%.
CIRCUMFLEX:  Has 2 sequential 90% stenosis.
RIGHT CORONARY:  Has ostial mid vessel, 90% stenosis.
 
PLAN:  Multi vessel intervention in a staged fashion.
 
DESCRIPTION OF PROCEDURE:  Using indwelling radial sheath, an XB LAD guide
catheter provided excellent guide catheter support.  First addressed was the
circumflex.  We addressed two 90% stenosis with both a 3.5 x 15 mm Integrity
drug-eluting stent up to 14 atmospheres.  Final angiography shows excellent
resolution of 90% stenosis in the circumflex, 89% stenosis in the circumflex. 
No significant residual.  The indwelling wire was placed across the diffusely
diseased LAD.  We went up and down this vessel with 3.0 x 12 mm balloon up to 14
atmospheres.  This showed excellent resolution of multiple areas of 90%
stenosis; however, one area of dissection.  This was addressed with 3.0 x 18
Integrity nondrug-eluting stent up to 16 atmospheres.  Final angiography shows
excellent resolution of both severe diffuse stenosis 90% LAD as well as
circumflex.  JORDAN flow was 3 throughout the procedure.  Heparin and Integrilin
were used during the case.  Sheath was closed with TR band.
 
TRANSINT:[id number] Voice Confirmation ID: [id number] DOCUMENT ID: [id number]
                                           
                                           HAKEEM MCCARTHY MD          
 
 
 
Electronically Signed by HAKEEM MCCARTHY on 21 at 0958
 
CC:                                                             [id number]
DICTATION DATE: 21 1056     :     21 1713      ADM IN  
                                                                              
[id number] [place] [address]

## 2021-04-02 NOTE — NUR
0545 PT HAVING DIFFICULTY BREATHING. HOB ELEVATED AND PT SUCTIONED.
RESPIRTATORY CALLED FOR ASSISTANCE.

## 2021-04-02 NOTE — NUR
Nutrition reassessment:
Pt now in ICU; evolving CVA with lethargy, breathing issues
Continues NPO
Labs reviewed
Ht: 6'0"  Wt: 163#
Labs reviewed
Estimated needs remain the same as initial assessment from 3/30/21
5518-8104 kcal,  g protein,  6434-0064 ml fluid or per MD
Nutrition diagnosis:
Inadequate oral intake R/T evolving stroke with AMS AEB pt continues NPO x 3
days.
Goals:
- Nutrition support will begin within 24 hours if pt remains NPO
- Stable dry wt with increase to UBW/IBW
- Meet at least 75% of estimated fluid needs
Interventions:
Recommend NGT placement and Nutrition support started; Osmolite 1.5 frederick
started @ 25 ml/hr with gradual increase to goal rate of 55 ml/hr
RDN will follow-up on pts progress toward nutrition goals: 4/5/21

## 2021-04-03 VITALS — SYSTOLIC BLOOD PRESSURE: 176 MMHG | DIASTOLIC BLOOD PRESSURE: 88 MMHG

## 2021-04-03 VITALS — SYSTOLIC BLOOD PRESSURE: 162 MMHG | DIASTOLIC BLOOD PRESSURE: 84 MMHG

## 2021-04-03 VITALS — SYSTOLIC BLOOD PRESSURE: 133 MMHG | DIASTOLIC BLOOD PRESSURE: 88 MMHG

## 2021-04-03 VITALS — DIASTOLIC BLOOD PRESSURE: 75 MMHG | SYSTOLIC BLOOD PRESSURE: 147 MMHG

## 2021-04-03 VITALS — DIASTOLIC BLOOD PRESSURE: 90 MMHG | SYSTOLIC BLOOD PRESSURE: 192 MMHG

## 2021-04-03 VITALS — SYSTOLIC BLOOD PRESSURE: 160 MMHG | DIASTOLIC BLOOD PRESSURE: 81 MMHG

## 2021-04-03 VITALS — SYSTOLIC BLOOD PRESSURE: 185 MMHG | DIASTOLIC BLOOD PRESSURE: 88 MMHG

## 2021-04-03 VITALS — DIASTOLIC BLOOD PRESSURE: 93 MMHG | SYSTOLIC BLOOD PRESSURE: 192 MMHG

## 2021-04-03 VITALS — SYSTOLIC BLOOD PRESSURE: 159 MMHG | DIASTOLIC BLOOD PRESSURE: 89 MMHG

## 2021-04-03 VITALS — DIASTOLIC BLOOD PRESSURE: 77 MMHG | SYSTOLIC BLOOD PRESSURE: 155 MMHG

## 2021-04-03 VITALS — SYSTOLIC BLOOD PRESSURE: 170 MMHG | DIASTOLIC BLOOD PRESSURE: 83 MMHG

## 2021-04-03 VITALS — DIASTOLIC BLOOD PRESSURE: 107 MMHG | SYSTOLIC BLOOD PRESSURE: 190 MMHG

## 2021-04-03 VITALS — DIASTOLIC BLOOD PRESSURE: 106 MMHG | SYSTOLIC BLOOD PRESSURE: 192 MMHG

## 2021-04-03 LAB
ANION GAP SERPL CALC-SCNC: 18 MMOL/L (ref 8–16)
BASOPHILS NFR BLD AUTO: 0.3 % (ref 0–2)
BUN SERPL-MCNC: 30 MG/DL (ref 7–18)
CALCIUM SERPL-MCNC: 8.4 MG/DL (ref 8.5–10.1)
CHLORIDE SERPL-SCNC: 110 MMOL/L (ref 98–107)
CO2 SERPL-SCNC: 19.9 MMOL/L (ref 21–32)
CREAT SERPL-MCNC: 1.2 MG/DL (ref 0.6–1.3)
EOSINOPHIL NFR BLD: 0.6 % (ref 0–7)
ERYTHROCYTE [DISTWIDTH] IN BLOOD BY AUTOMATED COUNT: 13.8 % (ref 11.5–14.5)
GLUCOSE SERPL-MCNC: 105 MG/DL (ref 74–106)
HCT VFR BLD CALC: 35.6 % (ref 42–54)
HGB BLD-MCNC: 11.8 G/DL (ref 13.5–17.5)
IMM GRANULOCYTES NFR BLD: 0.3 % (ref 0–5)
LYMPHOCYTES # BLD: 0.38 10X3/UL (ref 1.32–3.57)
LYMPHOCYTES NFR BLD AUTO: 5.5 % (ref 15–50)
MAGNESIUM SERPL-MCNC: 2.1 MG/DL (ref 1.8–2.4)
MCH RBC QN AUTO: 29.1 PG (ref 26–34)
MCHC RBC AUTO-ENTMCNC: 33.1 G/DL (ref 31–37)
MCV RBC: 87.9 FL (ref 80–100)
MONOCYTES NFR BLD: 11.3 % (ref 2–11)
NEUTROPHILS # BLD: 5.69 10X3/UL (ref 1.78–5.38)
NEUTROPHILS NFR BLD AUTO: 82 % (ref 40–80)
OSMOLALITY SERPL CALC.SUM OF ELEC: 292 MOSM/KG (ref 275–300)
PHOSPHATE SERPL-MCNC: 2.1 MG/DL (ref 2.5–4.9)
PLATELET # BLD: 296 10X3/UL (ref 130–400)
PMV BLD AUTO: 10 FL (ref 7.4–10.4)
POTASSIUM SERPL-SCNC: 3.9 MMOL/L (ref 3.5–5.1)
RBC # BLD AUTO: 4.05 10X6/UL (ref 4.2–6.1)
SODIUM SERPL-SCNC: 144 MMOL/L (ref 136–145)
WBC # BLD AUTO: 6.9 10X3/UL (ref 4.8–10.8)

## 2021-04-03 NOTE — NUR
NOTICED NO ANTICOAGULANTS FOR PT AT THIS TIME. CALLED AND SPOKE WITH
CARDIOLOGY ABOUT THIS. DR. LEMUS STATES HE WANTS TO CHECK WITH NEUROLOGY ABOUT
CONTINUATION OF ANTICOAGULANTS BEFORE HE DECIDES. SPOKE WITH DR. SALDAÑA. HE
STATES OKAY TO CONTINUE ANY ANTICOAGULANTS EXCEPT HEPARIN/LOVENOX.
1000- SPOKE WITH RYLEY MENJIVAR AND RELAYED MESSAGE ABOUT THE ANTICOAGULANTS FROM
DR. SALDAÑA. ORDERS RECIEVED TO DROP NG TUBE AS NEEDED AND START PO PLAVIX AND
ASPIRIN.
 
1020- ATTEMPTED TO EVALUATE SWALLOWING ABILITY OF PT. ABLE TO TOLERATE SMALL
AMOUNTS OF WATER. ORAL CARE PERFORMED.
SORES FOUND ON TONGUE. WHEN ASKED IF HIS TONGUE HURT HE
STATES "DOES MY TONGUE HURT? NO. DOES YOUR PUSSY HURT?" INSTRUCTED PATIENT
THAT INNAPROPRIATE LANGUAGE WOULD NOT BE TOLERATED. ATTEMPTED APPLESAUCE IN
SMALL AMOUNTS, PT WILL NOT SWALLOW THE APPLESAUCE WITHOUT BEING REMINDED
SEVERAL TIMES AND WATER FOLLOWING IT. PT IS NOW SPEAKING CLEARLY AND AT TIMES
YELLING OUT. NO SIGN OF ASPIRATION AT THIS TIME. WILL CONTINUE TO TRY
SWALLOWING EXERISES THROUGHOUT THE DAY.

## 2021-04-03 NOTE — NUR
SKIN TEAR TO LEFT ARM. DRESSING CHANGED, CDI. NOTICED CONDOM CATH CAME OFF.
REPLACED. NO REDNESS OR IRRITATION NOTED AROUND CONDOM CATH. LINENS AND GOWN
CHANGED. BED IN LOWEST POSITION. CALL LIGHT WITHIN REACH. BED RAILS UP X2

## 2021-04-03 NOTE — NUR
Shift summary: Patient talkative, speech slurred.  Able to express needs.
Follows commands appropriately.  Makes small independent position change in
bed, able to use right side.  Small amount of blood with hemmorhoid, no BM.

## 2021-04-04 VITALS — DIASTOLIC BLOOD PRESSURE: 96 MMHG | SYSTOLIC BLOOD PRESSURE: 182 MMHG

## 2021-04-04 VITALS — DIASTOLIC BLOOD PRESSURE: 86 MMHG | SYSTOLIC BLOOD PRESSURE: 177 MMHG

## 2021-04-04 VITALS — DIASTOLIC BLOOD PRESSURE: 82 MMHG | SYSTOLIC BLOOD PRESSURE: 174 MMHG

## 2021-04-04 VITALS — DIASTOLIC BLOOD PRESSURE: 95 MMHG | SYSTOLIC BLOOD PRESSURE: 174 MMHG

## 2021-04-04 VITALS — DIASTOLIC BLOOD PRESSURE: 75 MMHG | SYSTOLIC BLOOD PRESSURE: 138 MMHG

## 2021-04-04 VITALS — DIASTOLIC BLOOD PRESSURE: 88 MMHG | SYSTOLIC BLOOD PRESSURE: 176 MMHG

## 2021-04-04 VITALS — DIASTOLIC BLOOD PRESSURE: 82 MMHG | SYSTOLIC BLOOD PRESSURE: 164 MMHG

## 2021-04-04 VITALS — DIASTOLIC BLOOD PRESSURE: 77 MMHG | SYSTOLIC BLOOD PRESSURE: 136 MMHG

## 2021-04-04 VITALS — DIASTOLIC BLOOD PRESSURE: 78 MMHG | SYSTOLIC BLOOD PRESSURE: 147 MMHG

## 2021-04-04 VITALS — SYSTOLIC BLOOD PRESSURE: 164 MMHG | DIASTOLIC BLOOD PRESSURE: 90 MMHG

## 2021-04-04 VITALS — SYSTOLIC BLOOD PRESSURE: 149 MMHG | DIASTOLIC BLOOD PRESSURE: 81 MMHG

## 2021-04-04 VITALS — SYSTOLIC BLOOD PRESSURE: 156 MMHG | DIASTOLIC BLOOD PRESSURE: 94 MMHG

## 2021-04-04 VITALS — DIASTOLIC BLOOD PRESSURE: 81 MMHG | SYSTOLIC BLOOD PRESSURE: 145 MMHG

## 2021-04-04 VITALS — DIASTOLIC BLOOD PRESSURE: 80 MMHG | SYSTOLIC BLOOD PRESSURE: 153 MMHG

## 2021-04-04 VITALS — SYSTOLIC BLOOD PRESSURE: 171 MMHG | DIASTOLIC BLOOD PRESSURE: 83 MMHG

## 2021-04-04 VITALS — SYSTOLIC BLOOD PRESSURE: 133 MMHG | DIASTOLIC BLOOD PRESSURE: 79 MMHG

## 2021-04-04 LAB
ALBUMIN SERPL-MCNC: 3.2 G/DL (ref 3.4–5)
ALP SERPL-CCNC: 88 U/L (ref 30–120)
ALT SERPL-CCNC: 29 U/L (ref 10–68)
ANION GAP SERPL CALC-SCNC: 17.1 MMOL/L (ref 8–16)
BASOPHILS NFR BLD AUTO: 0.3 % (ref 0–2)
BILIRUB SERPL-MCNC: 0.62 MG/DL (ref 0.2–1.3)
BUN SERPL-MCNC: 30 MG/DL (ref 7–18)
CALCIUM SERPL-MCNC: 8.5 MG/DL (ref 8.5–10.1)
CHLORIDE SERPL-SCNC: 111 MMOL/L (ref 98–107)
CO2 SERPL-SCNC: 20.6 MMOL/L (ref 21–32)
CREAT SERPL-MCNC: 1.2 MG/DL (ref 0.6–1.3)
EOSINOPHIL NFR BLD: 0.7 % (ref 0–7)
ERYTHROCYTE [DISTWIDTH] IN BLOOD BY AUTOMATED COUNT: 13.8 % (ref 11.5–14.5)
GLOBULIN SER-MCNC: 3.9 G/L
GLUCOSE SERPL-MCNC: 91 MG/DL (ref 74–106)
HCT VFR BLD CALC: 35.9 % (ref 42–54)
HGB BLD-MCNC: 11.9 G/DL (ref 13.5–17.5)
IMM GRANULOCYTES NFR BLD: 0.3 % (ref 0–5)
LYMPHOCYTES # BLD: 0.53 10X3/UL (ref 1.32–3.57)
LYMPHOCYTES NFR BLD AUTO: 8.9 % (ref 15–50)
MCH RBC QN AUTO: 29 PG (ref 26–34)
MCHC RBC AUTO-ENTMCNC: 33.1 G/DL (ref 31–37)
MCV RBC: 87.3 FL (ref 80–100)
MONOCYTES NFR BLD: 9.8 % (ref 2–11)
NEUTROPHILS # BLD: 4.75 10X3/UL (ref 1.78–5.38)
NEUTROPHILS NFR BLD AUTO: 80 % (ref 40–80)
OSMOLALITY SERPL CALC.SUM OF ELEC: 294 MOSM/KG (ref 275–300)
PLATELET # BLD: 304 10X3/UL (ref 130–400)
PMV BLD AUTO: 9.9 FL (ref 7.4–10.4)
POTASSIUM SERPL-SCNC: 3.7 MMOL/L (ref 3.5–5.1)
PROT SERPL-MCNC: 7.1 G/DL (ref 6.4–8.2)
RBC # BLD AUTO: 4.11 10X6/UL (ref 4.2–6.1)
SODIUM SERPL-SCNC: 145 MMOL/L (ref 136–145)
WBC # BLD AUTO: 5.9 10X3/UL (ref 4.8–10.8)

## 2021-04-04 NOTE — NUR
1330 DR SIMONS IN TO SEE PATIENT... TRANSFER ORDER RECIEVED.. PATIENT IS
WITHOUT CHANGES... WHEN HE IS STIMULATED HIS BP RISES
1345 ROOM 2108 FOR TRANSFER.. REPORT CALLED TO WESLEY

## 2021-04-04 NOTE — NUR
PT ARIVED VIA BED TO ROOM FROM ICU. RESTING PEACEFULLY. EYES CLOSED, BREATHS
EVEN, REGULAR AND UNLABORED. NO SIGNS OR SYMPTOMS OF ACUTE DISTRESS NOTED AT
THIS TIME. CL IN REACH, SRX2, BED ALARM ON AND ACTIVE WNL.

## 2021-04-04 NOTE — NUR
PT EYES CLOSED, CONTINOUSLY MUMBLING TO HIMSELF OR LOUDLY SPEAKING SEEMINGLY
RADNOM WORDS. WHEN ASKED WHAT HE NEEDS, ,PT DOES NOT PROVIDE DIRECTIVE. CL IN
REACH, SRX2, BED ALARM ON AND ACTIVE WNL.

## 2021-04-04 NOTE — NUR
0700 REPORT RECIEVED AND CARE ASSUMED OF PATIENT.... SEE FLOW SHEET FOR SHIFT
ASSESMENT FINDINGS..
0900 COMPLETE CHG BED BATH GIVEN PIV SITES X 2 ON THE RIGHT DCd BOTH SITES ARE
PINK AND DO NOT FLUSH THE PIV ON THE LEFT FA IS PATENT..NS FLUID BAG AND
TUBING CHANGE IS DONE AND MOVED TO THAT SITE,, ORAL CARE GIVEN ... CONDOM CATH
IS REMOVED AND PERICARE DONE.. NOTED THERE IS A SMALL BROKEN SKIN AREA IN
BUTTOCK CRACK AND BUTT BALM APPLIED... NOTED REDNESS IN BOTH HEELS AND SCD
REMOVED FOR INSPECTION .. THERE IS A BROKEN AREA OF SKIN ON BILATERAL SHINS
MEPLEX DRESSINGS APPLIED AND SCDs PUT BACK ON .. BILATERAL HEELS ARE BLANCHING
PINK HEEL PROTECTORS ON.... ADULT DIAPER PLACED ON PATIENT IN PLACE OF CONDIM
CATH THAT DOES NOT STAY ON... TOTAL LINEN CHANGE DONE...
0945 RESTING QUIETLY AT THIS TIME... DR LEMUS IN TO SEE PATIENT.. UPDATE IS
GIVEN .. OK THAT NTG IS NOT INFUSING..

## 2021-04-04 NOTE — NUR
PT ALERT AND ORIETNED, CALLING OUT FOR ASSISTANCE. PROVIDED ORAL CARE, PT
STATES HE FEEL VERY THIRSTY. PT CLEAN AND DRY AT THIS TIME. CL IN REACH, SRX2.
BED ALARM ON AND ACTIVE.

## 2021-04-05 VITALS — DIASTOLIC BLOOD PRESSURE: 82 MMHG | SYSTOLIC BLOOD PRESSURE: 157 MMHG

## 2021-04-05 VITALS — DIASTOLIC BLOOD PRESSURE: 86 MMHG | SYSTOLIC BLOOD PRESSURE: 191 MMHG

## 2021-04-05 VITALS — SYSTOLIC BLOOD PRESSURE: 202 MMHG | DIASTOLIC BLOOD PRESSURE: 110 MMHG

## 2021-04-05 VITALS — SYSTOLIC BLOOD PRESSURE: 187 MMHG | DIASTOLIC BLOOD PRESSURE: 85 MMHG

## 2021-04-05 LAB
ANION GAP SERPL CALC-SCNC: 15.9 MMOL/L (ref 8–16)
BASOPHILS NFR BLD AUTO: 0.5 % (ref 0–2)
BUN SERPL-MCNC: 30 MG/DL (ref 7–18)
CALCIUM SERPL-MCNC: 8.5 MG/DL (ref 8.5–10.1)
CHLORIDE SERPL-SCNC: 112 MMOL/L (ref 98–107)
CO2 SERPL-SCNC: 21.5 MMOL/L (ref 21–32)
CREAT SERPL-MCNC: 1 MG/DL (ref 0.6–1.3)
EOSINOPHIL NFR BLD: 0.8 % (ref 0–7)
ERYTHROCYTE [DISTWIDTH] IN BLOOD BY AUTOMATED COUNT: 13.8 % (ref 11.5–14.5)
GLUCOSE SERPL-MCNC: 95 MG/DL (ref 74–106)
HCT VFR BLD CALC: 36.1 % (ref 42–54)
HGB BLD-MCNC: 12 G/DL (ref 13.5–17.5)
IMM GRANULOCYTES NFR BLD: 0.5 % (ref 0–5)
LYMPHOCYTES # BLD: 0.47 10X3/UL (ref 1.32–3.57)
LYMPHOCYTES NFR BLD AUTO: 7.2 % (ref 15–50)
MCH RBC QN AUTO: 29.2 PG (ref 26–34)
MCHC RBC AUTO-ENTMCNC: 33.2 G/DL (ref 31–37)
MCV RBC: 87.8 FL (ref 80–100)
MONOCYTES NFR BLD: 7 % (ref 2–11)
NEUTROPHILS # BLD: 5.53 10X3/UL (ref 1.78–5.38)
NEUTROPHILS NFR BLD AUTO: 84 % (ref 40–80)
OSMOLALITY SERPL CALC.SUM OF ELEC: 296 MOSM/KG (ref 275–300)
PLATELET # BLD: 320 10X3/UL (ref 130–400)
PMV BLD AUTO: 10.4 FL (ref 7.4–10.4)
POTASSIUM SERPL-SCNC: 3.4 MMOL/L (ref 3.5–5.1)
RBC # BLD AUTO: 4.11 10X6/UL (ref 4.2–6.1)
SODIUM SERPL-SCNC: 146 MMOL/L (ref 136–145)
WBC # BLD AUTO: 6.6 10X3/UL (ref 4.8–10.8)

## 2021-04-05 NOTE — NUR
AFTER REVIEWING CHART, NURSE NOTICES THERE IS NO CHARTING ON WHO ORDERS NG
TUBE OR WHO PLACED NG TUBE. WILL CONTINUE TO FIND OUT WHAT TO DO NEXT

## 2021-04-05 NOTE — NUR
Nutrition Reassessment/Follow-up:
NPO. Noted surgery consulted for PEG placement. Per nursing, pt c/o sore
tongue.
Wt: 151# (4/4); 163# (4/2)
Labs noted: Na 146, K+ 4.6
Meds noted: Thiamine, Folate, Pepcid, NS @ 75, electrolyte protocol
Est needs:
 7982-5587 kcal/day (25-30 kcal/kg)
 70-85 g protein/day (1-1.2 g/kg)
 0705-0153 mL fluid/day (1 mL/kcal) or per MD
-When PEG placed, rec Osmolite 1.5 @ goal rate of 55 mL/hr; provides 1980 kcal
 (% est needs) & 83 g protein (% est needs) daily.
-RD will follow up within 2-3 days.

## 2021-04-05 NOTE — NUR
SPEECH RE EVALUATED PT AGAIN - AFTER HIS EVAL. PATIENT REMAINS NPO. DID NOT
MANAGE TO SHOW IMPROVEMENT FROM SWALLOWING STUDY

## 2021-04-05 NOTE — NUR
RECIEVED LAYING IN BED WITH EYES OPEN. WILL NOT ANSWER QUESTIONS. REPORTED HE
COULD SPEAK. REMAINS NPO AND HAS SURGERY IN AM FOR PEG PLACEMENT. CALLED LING STANFORD FOR ORDERS D/T LOPRESSOR DUE AT 2100. APRESOLINE HAS BEEN GIVEN LAST
COUPLE OF DAYS. LING STANFORD STATED " GO AHEAD AND GIVE HIM THE APRESOLIN. I
THINK HE'LL BE OKAY TIL TOMORROW".

## 2021-04-06 VITALS — DIASTOLIC BLOOD PRESSURE: 84 MMHG | SYSTOLIC BLOOD PRESSURE: 122 MMHG

## 2021-04-06 VITALS — DIASTOLIC BLOOD PRESSURE: 59 MMHG | SYSTOLIC BLOOD PRESSURE: 102 MMHG

## 2021-04-06 VITALS — DIASTOLIC BLOOD PRESSURE: 86 MMHG | SYSTOLIC BLOOD PRESSURE: 181 MMHG

## 2021-04-06 VITALS — DIASTOLIC BLOOD PRESSURE: 86 MMHG | SYSTOLIC BLOOD PRESSURE: 191 MMHG

## 2021-04-06 VITALS — SYSTOLIC BLOOD PRESSURE: 166 MMHG | DIASTOLIC BLOOD PRESSURE: 76 MMHG

## 2021-04-06 VITALS — DIASTOLIC BLOOD PRESSURE: 78 MMHG | SYSTOLIC BLOOD PRESSURE: 128 MMHG

## 2021-04-06 VITALS — SYSTOLIC BLOOD PRESSURE: 126 MMHG | DIASTOLIC BLOOD PRESSURE: 71 MMHG

## 2021-04-06 VITALS — DIASTOLIC BLOOD PRESSURE: 73 MMHG | SYSTOLIC BLOOD PRESSURE: 120 MMHG

## 2021-04-06 VITALS — DIASTOLIC BLOOD PRESSURE: 69 MMHG | SYSTOLIC BLOOD PRESSURE: 137 MMHG

## 2021-04-06 VITALS — DIASTOLIC BLOOD PRESSURE: 101 MMHG | SYSTOLIC BLOOD PRESSURE: 194 MMHG

## 2021-04-06 VITALS — SYSTOLIC BLOOD PRESSURE: 129 MMHG | DIASTOLIC BLOOD PRESSURE: 76 MMHG

## 2021-04-06 VITALS — DIASTOLIC BLOOD PRESSURE: 61 MMHG | SYSTOLIC BLOOD PRESSURE: 110 MMHG

## 2021-04-06 VITALS — DIASTOLIC BLOOD PRESSURE: 56 MMHG | SYSTOLIC BLOOD PRESSURE: 98 MMHG

## 2021-04-06 VITALS — DIASTOLIC BLOOD PRESSURE: 73 MMHG | SYSTOLIC BLOOD PRESSURE: 126 MMHG

## 2021-04-06 LAB
ALBUMIN SERPL-MCNC: 3.2 G/DL (ref 3.4–5)
ALP SERPL-CCNC: 93 U/L (ref 30–120)
ALT SERPL-CCNC: 29 U/L (ref 10–68)
ANION GAP SERPL CALC-SCNC: 15.6 MMOL/L (ref 8–16)
BASOPHILS NFR BLD AUTO: 0.3 % (ref 0–2)
BILIRUB SERPL-MCNC: 0.66 MG/DL (ref 0.2–1.3)
BUN SERPL-MCNC: 22 MG/DL (ref 7–18)
CALCIUM SERPL-MCNC: 8.4 MG/DL (ref 8.5–10.1)
CHLORIDE SERPL-SCNC: 109 MMOL/L (ref 98–107)
CO2 SERPL-SCNC: 22.7 MMOL/L (ref 21–32)
CREAT SERPL-MCNC: 1 MG/DL (ref 0.6–1.3)
EOSINOPHIL NFR BLD: 0.4 % (ref 0–7)
ERYTHROCYTE [DISTWIDTH] IN BLOOD BY AUTOMATED COUNT: 13.5 % (ref 11.5–14.5)
GLOBULIN SER-MCNC: 3.7 G/L
GLUCOSE SERPL-MCNC: 96 MG/DL (ref 74–106)
HCT VFR BLD CALC: 37 % (ref 42–54)
HGB BLD-MCNC: 12.5 G/DL (ref 13.5–17.5)
IMM GRANULOCYTES NFR BLD: 0.4 % (ref 0–5)
LYMPHOCYTES # BLD: 0.4 10X3/UL (ref 1.32–3.57)
LYMPHOCYTES NFR BLD AUTO: 5.6 % (ref 15–50)
MCH RBC QN AUTO: 28.9 PG (ref 26–34)
MCHC RBC AUTO-ENTMCNC: 33.8 G/DL (ref 31–37)
MCV RBC: 85.6 FL (ref 80–100)
MONOCYTES NFR BLD: 8.5 % (ref 2–11)
NEUTROPHILS # BLD: 6.09 10X3/UL (ref 1.78–5.38)
NEUTROPHILS NFR BLD AUTO: 84.8 % (ref 40–80)
OSMOLALITY SERPL CALC.SUM OF ELEC: 289 MOSM/KG (ref 275–300)
PLATELET # BLD: 302 10X3/UL (ref 130–400)
PMV BLD AUTO: 10 FL (ref 7.4–10.4)
POTASSIUM SERPL-SCNC: 3.3 MMOL/L (ref 3.5–5.1)
PROT SERPL-MCNC: 6.9 G/DL (ref 6.4–8.2)
RBC # BLD AUTO: 4.32 10X6/UL (ref 4.2–6.1)
SODIUM SERPL-SCNC: 144 MMOL/L (ref 136–145)
WBC # BLD AUTO: 7.2 10X3/UL (ref 4.8–10.8)

## 2021-04-06 PROCEDURE — 0DH63UZ INSERTION OF FEEDING DEVICE INTO STOMACH, PERCUTANEOUS APPROACH: ICD-10-PCS | Performed by: SURGERY

## 2021-04-06 PROCEDURE — 0DB78ZX EXCISION OF STOMACH, PYLORUS, VIA NATURAL OR ARTIFICIAL OPENING ENDOSCOPIC, DIAGNOSTIC: ICD-10-PCS | Performed by: SURGERY

## 2021-04-06 NOTE — NUR
NURSE CALLS NABIL HEDRICK AND LETS HIM KNOW THAT THIS PATIENT IS STILL NOT WAKING
UP EVEN WITH STERNAL RUBS. PATIENT WILL GRIMMACE BUT WILL NOT OPEN EYES OR
RESPOND TO QUESTIONS. NABIL STATES THAT AS LONG AS PATIENT'S VITALS ARE WNL
THAT NURSE COULD JUST CONTINUE TO WATCH PATIENT.

## 2021-04-06 NOTE — NUR
JUST RECEIVED PATIENT BACK FROM PEG TUBE PLACEMENT. PATIENT IS SLEEPING
SOUNDLY, SNORING. VITALS WNL. PATIENT ON CONTINUOUS VITALS AFTER HIS
PROCEDURE.

## 2021-04-06 NOTE — NUR
ABDOMEN IS FLAT AND HARD. UNABLE TO DETECT BOWEL SOUNDS. XRAY HERE AND
COMPLETED THE STAT KUB.WILL REPORT RESULTS.

## 2021-04-07 VITALS — SYSTOLIC BLOOD PRESSURE: 165 MMHG | DIASTOLIC BLOOD PRESSURE: 84 MMHG

## 2021-04-07 VITALS — DIASTOLIC BLOOD PRESSURE: 84 MMHG | SYSTOLIC BLOOD PRESSURE: 163 MMHG

## 2021-04-07 VITALS — SYSTOLIC BLOOD PRESSURE: 168 MMHG | DIASTOLIC BLOOD PRESSURE: 82 MMHG

## 2021-04-07 VITALS — SYSTOLIC BLOOD PRESSURE: 120 MMHG | DIASTOLIC BLOOD PRESSURE: 76 MMHG

## 2021-04-07 VITALS — SYSTOLIC BLOOD PRESSURE: 194 MMHG | DIASTOLIC BLOOD PRESSURE: 92 MMHG

## 2021-04-07 VITALS — DIASTOLIC BLOOD PRESSURE: 88 MMHG | SYSTOLIC BLOOD PRESSURE: 180 MMHG

## 2021-04-07 LAB
ALBUMIN SERPL-MCNC: 2.7 G/DL (ref 3.4–5)
ALP SERPL-CCNC: 79 U/L (ref 30–120)
ALT SERPL-CCNC: 24 U/L (ref 10–68)
ANION GAP SERPL CALC-SCNC: 14.4 MMOL/L (ref 8–16)
BASOPHILS NFR BLD AUTO: 0.2 % (ref 0–2)
BILIRUB SERPL-MCNC: 0.58 MG/DL (ref 0.2–1.3)
BUN SERPL-MCNC: 27 MG/DL (ref 7–18)
CALCIUM SERPL-MCNC: 8.2 MG/DL (ref 8.5–10.1)
CHLORIDE SERPL-SCNC: 111 MMOL/L (ref 98–107)
CO2 SERPL-SCNC: 22.6 MMOL/L (ref 21–32)
CREAT SERPL-MCNC: 1.1 MG/DL (ref 0.6–1.3)
EOSINOPHIL NFR BLD: 0.3 % (ref 0–7)
ERYTHROCYTE [DISTWIDTH] IN BLOOD BY AUTOMATED COUNT: 13.6 % (ref 11.5–14.5)
GLOBULIN SER-MCNC: 3.8 G/L
GLUCOSE SERPL-MCNC: 87 MG/DL (ref 74–106)
HCT VFR BLD CALC: 33.2 % (ref 42–54)
HGB BLD-MCNC: 11.2 G/DL (ref 13.5–17.5)
IMM GRANULOCYTES NFR BLD: 0.5 % (ref 0–5)
LYMPHOCYTES # BLD: 0.42 10X3/UL (ref 1.32–3.57)
LYMPHOCYTES NFR BLD AUTO: 7.1 % (ref 15–50)
MAGNESIUM SERPL-MCNC: 2.1 MG/DL (ref 1.8–2.4)
MCH RBC QN AUTO: 29.1 PG (ref 26–34)
MCHC RBC AUTO-ENTMCNC: 33.7 G/DL (ref 31–37)
MCV RBC: 86.2 FL (ref 80–100)
MONOCYTES NFR BLD: 10.7 % (ref 2–11)
NEUTROPHILS # BLD: 4.79 10X3/UL (ref 1.78–5.38)
NEUTROPHILS NFR BLD AUTO: 81.2 % (ref 40–80)
OSMOLALITY SERPL CALC.SUM OF ELEC: 292 MOSM/KG (ref 275–300)
PLATELET # BLD: 300 10X3/UL (ref 130–400)
PMV BLD AUTO: 10.2 FL (ref 7.4–10.4)
POTASSIUM SERPL-SCNC: 3 MMOL/L (ref 3.5–5.1)
PROT SERPL-MCNC: 6.5 G/DL (ref 6.4–8.2)
RBC # BLD AUTO: 3.85 10X6/UL (ref 4.2–6.1)
SODIUM SERPL-SCNC: 145 MMOL/L (ref 136–145)
WBC # BLD AUTO: 5.9 10X3/UL (ref 4.8–10.8)

## 2021-04-07 NOTE — NUR
PATIENT IS AWAKE THIS AM, CRYING. DOES NOT KNOW WHERE HE IS AT , BUT IS
VERBAL. QUIETLY STILL. ASSESSMENTHAS BEEN COMPLETED. CALL LIGHT IN REACH

## 2021-04-07 NOTE — NUR
Nutrition Consult/Follow-up:
POD 1 PEG placement. Received consult from surgery for TF.
Diet: NPO
No new wt; last wt: 151# (4/4)
Labs noted: K+ 3.0, Ca 8.2, Alb 2.7
Meds noted: Thiamine, Folate, Pepcid, NS @ 50, electrolyte protocol
-Start Osmolite 1.5 @ 20 mL/hr and increase by 10 mL/hr q 6 hrs to goal rate
 of 55 mL/hr + H2O flushes 25 mL q hr.
-Need new wt.
-RD follow-up: 4/8
 
Thanks for the consult!

## 2021-04-07 NOTE — NUR
PEG TUBE CHECKED FOR PATENCY. NOTHING ASPIRATED. RUNNING AT 20CC/HR. FOUND
TUBING UNPLUGGED AND DRIPPING ON THE FLOOR. REPLACED AND RUNNING AT THIS TIME.

## 2021-04-08 VITALS — SYSTOLIC BLOOD PRESSURE: 152 MMHG | DIASTOLIC BLOOD PRESSURE: 86 MMHG

## 2021-04-08 VITALS — DIASTOLIC BLOOD PRESSURE: 108 MMHG | SYSTOLIC BLOOD PRESSURE: 203 MMHG

## 2021-04-08 VITALS — SYSTOLIC BLOOD PRESSURE: 160 MMHG | DIASTOLIC BLOOD PRESSURE: 91 MMHG

## 2021-04-08 VITALS — SYSTOLIC BLOOD PRESSURE: 184 MMHG | DIASTOLIC BLOOD PRESSURE: 92 MMHG

## 2021-04-08 LAB
ALBUMIN SERPL-MCNC: 2.7 G/DL (ref 3.4–5)
ALP SERPL-CCNC: 79 U/L (ref 30–120)
ALT SERPL-CCNC: 19 U/L (ref 10–68)
ANION GAP SERPL CALC-SCNC: 13 MMOL/L (ref 8–16)
BASOPHILS NFR BLD AUTO: 0.4 % (ref 0–2)
BILIRUB SERPL-MCNC: 0.43 MG/DL (ref 0.2–1.3)
BUN SERPL-MCNC: 26 MG/DL (ref 7–18)
CALCIUM SERPL-MCNC: 8 MG/DL (ref 8.5–10.1)
CHLORIDE SERPL-SCNC: 109 MMOL/L (ref 98–107)
CO2 SERPL-SCNC: 23.5 MMOL/L (ref 21–32)
CREAT SERPL-MCNC: 1 MG/DL (ref 0.6–1.3)
EOSINOPHIL NFR BLD: 2.2 % (ref 0–7)
ERYTHROCYTE [DISTWIDTH] IN BLOOD BY AUTOMATED COUNT: 13.5 % (ref 11.5–14.5)
GLOBULIN SER-MCNC: 3.8 G/L
GLUCOSE SERPL-MCNC: 134 MG/DL (ref 74–106)
HCT VFR BLD CALC: 33.9 % (ref 42–54)
HGB BLD-MCNC: 11.4 G/DL (ref 13.5–17.5)
IMM GRANULOCYTES NFR BLD: 0.4 % (ref 0–5)
LYMPHOCYTES # BLD: 0.37 10X3/UL (ref 1.32–3.57)
LYMPHOCYTES NFR BLD AUTO: 7.4 % (ref 15–50)
MAGNESIUM SERPL-MCNC: 2 MG/DL (ref 1.8–2.4)
MCH RBC QN AUTO: 28.8 PG (ref 26–34)
MCHC RBC AUTO-ENTMCNC: 33.6 G/DL (ref 31–37)
MCV RBC: 85.6 FL (ref 80–100)
MONOCYTES NFR BLD: 11.8 % (ref 2–11)
NEUTROPHILS # BLD: 3.9 10X3/UL (ref 1.78–5.38)
NEUTROPHILS NFR BLD AUTO: 77.8 % (ref 40–80)
OSMOLALITY SERPL CALC.SUM OF ELEC: 289 MOSM/KG (ref 275–300)
PLATELET # BLD: 307 10X3/UL (ref 130–400)
PMV BLD AUTO: 10.2 FL (ref 7.4–10.4)
POTASSIUM SERPL-SCNC: 3.5 MMOL/L (ref 3.5–5.1)
PROT SERPL-MCNC: 6.5 G/DL (ref 6.4–8.2)
RBC # BLD AUTO: 3.96 10X6/UL (ref 4.2–6.1)
SODIUM SERPL-SCNC: 142 MMOL/L (ref 136–145)
WBC # BLD AUTO: 5 10X3/UL (ref 4.8–10.8)

## 2021-04-08 NOTE — OP
PATIENT NAME:  CHRIS HAYDEN                               MEDICAL RECORD: [id number]
:54                                             LOCATION:D.M2     D.2108
                                                         ADMISSION DATE:21
SURGEON:  NOAH CAMPA MD            
 
 
DATE OF OPERATION:  2021
 
PREOPERATIVE DIAGNOSES:
1. Cerebrovascular accident.
2. Failed swallowing study.
3. Dysphagia.
4. Acute malnutrition.
 
POSTOPERATIVE DIAGNOSES:
1. Cerebrovascular accident.
2. Failed swallowing study.
3. Dysphagia.
4. Acute malnutrition.
5. Moderate gastritis involving the fundus and antrum with fissuring, ulcers and
erosions.
6. Normal-appearing duodenum.
7. Normal appearing esophagus.
 
PROCEDURE:
1. Esophagogastroduodenoscopy with antral biopsies to rule out Helicobacter
pylori.
2. Placement of 20-Surinamese percutaneous endoscopic gastrostomy tube.
 
SURGEON:  Noah Campa MD
 
ASSISTANT:  None.
 
BLOOD LOSS:  Minimal.
 
ANESTHESIA:  Local with IV sedation.
 
COMPLICATIONS:  None.
 
The risks, possible complications, and alternatives of the procedure were
explained to the patient's family.  They elected to proceed.
 
ENDOSCOPIC COURSE:  The patient was conveyed to the endoscopy suite electively
on 2021.  General anesthesia was induced by the anesthesia staff.  The
abdomen was sterilely prepped and draped.  A bite block was inserted.  A
gastroscope was inserted through the bite block into the oropharynx.  The
hypopharynx was full of thick secretions, which I aspirated.  I then intubated
the esophagus easily as well as the stomach and duodenum.  Upon withdrawal,
retroflexed and angulus views were obtained.  Antral biopsies were obtained.
 
I then transilluminated the abdominal wall.  We found an area once we indented
the anterior abdominal wall in the epigastrium that was suitable for placement
of a gastrostomy tube.  This area was infiltrated with a local anesthetic.  A
skin incision was accomplished.  Through the skin incision, I passed the
Angiocath.  The Angiocath was used to puncture the stomach on the first try. 
Through the Angiocath, I advanced a wire.  This was grasped with an endoscopic
snare.  The wire and snare were then brought out through the mouth.  The wire
was attached to a pull-type gastrostomy tube, which was then pulled into place. 
 
 
 
OPERATIVE REPORT                               [id number]    CHRIS HAYDEN             
 
 
I re-endoscoped the patient's esophagus and stomach.  There has been no false
passage or perforation.  The gastroscope was then withdrawn under direct vision.
 
Hub and flange attachments were applied to the gastrostomy tube.  A sterile
dressing was then applied.
 
The patient was then conveyed back to his room.  We can begin using the
gastrostomy tube for feedings tomorrow.
 
TRANSINT:[id number] Voice Confirmation ID: [id number] DOCUMENT ID: [id number]
                                           
                                           NOAH CAMPA MD            
 
 
 
Electronically Signed by NOAH CAMPA on 21 at 1516
 
 
 
 
 
 
 
 
 
 
 
 
 
 
 
 
 
 
 
 
 
 
 
 
 
 
 
 
 
 
 
CC:                                                             [id number]
DICTATION DATE: 21 185     :     21 0304      ADM IN  
                                                                              
[facility]                                          
[address] [id number]

## 2021-04-08 NOTE — NUR
PT TURNED AND CLEANED FROM INCONTINENT VOID.  POSITIONED FOR COMFORT.  HEELS
ELEVATED.  SMALL AMT BLEEDING FROM HEMMORHOIDS.  LEFT SIDE AFFECTED FROM PRIOR
CVA.  HE GETS AGITATED WHEN UNABLE TO UNDERSTAND HIM.

## 2021-04-08 NOTE — NUR
Nutrition Follow-up:
Nursing reports pt tolerating TF @ 30 mL/hr this AM with plans to increase to
40 mL/hr soon. Awaiting placement.
Diet: Osmolite 1.5 - goal rate 55 mL/hr + H2O flushes 25 mL q hr
No new wt; last wt: 151# (4/4)
Labs noted: Glu 134, Ca 8.0, Alb 2.7
Meds noted: Thiamine, Folate, Pepcid, NS @ 50, electrolyte protocol
-Increase TF to goal rate as tolerated.
-Need new wt.
-RD follow-up: 4/9

## 2021-04-09 VITALS — DIASTOLIC BLOOD PRESSURE: 73 MMHG | SYSTOLIC BLOOD PRESSURE: 129 MMHG

## 2021-04-09 VITALS — SYSTOLIC BLOOD PRESSURE: 128 MMHG | DIASTOLIC BLOOD PRESSURE: 69 MMHG

## 2021-04-09 VITALS — DIASTOLIC BLOOD PRESSURE: 72 MMHG | SYSTOLIC BLOOD PRESSURE: 142 MMHG

## 2021-04-09 VITALS — DIASTOLIC BLOOD PRESSURE: 72 MMHG | SYSTOLIC BLOOD PRESSURE: 133 MMHG

## 2021-04-09 VITALS — DIASTOLIC BLOOD PRESSURE: 78 MMHG | SYSTOLIC BLOOD PRESSURE: 139 MMHG

## 2021-04-09 LAB
ALBUMIN SERPL-MCNC: 2.6 G/DL (ref 3.4–5)
ALP SERPL-CCNC: 81 U/L (ref 30–120)
ALT SERPL-CCNC: 24 U/L (ref 10–68)
ANION GAP SERPL CALC-SCNC: 10.1 MMOL/L (ref 8–16)
BASOPHILS NFR BLD AUTO: 0.4 % (ref 0–2)
BILIRUB SERPL-MCNC: 0.38 MG/DL (ref 0.2–1.3)
BUN SERPL-MCNC: 23 MG/DL (ref 7–18)
CALCIUM SERPL-MCNC: 8 MG/DL (ref 8.5–10.1)
CHLORIDE SERPL-SCNC: 107 MMOL/L (ref 98–107)
CO2 SERPL-SCNC: 25.3 MMOL/L (ref 21–32)
CREAT SERPL-MCNC: 0.9 MG/DL (ref 0.6–1.3)
EOSINOPHIL NFR BLD: 1.6 % (ref 0–7)
ERYTHROCYTE [DISTWIDTH] IN BLOOD BY AUTOMATED COUNT: 13.5 % (ref 11.5–14.5)
GLOBULIN SER-MCNC: 4 G/L
GLUCOSE SERPL-MCNC: 134 MG/DL (ref 74–106)
HCT VFR BLD CALC: 35 % (ref 42–54)
HGB BLD-MCNC: 11.9 G/DL (ref 13.5–17.5)
IMM GRANULOCYTES NFR BLD: 0.4 % (ref 0–5)
LYMPHOCYTES # BLD: 0.57 10X3/UL (ref 1.32–3.57)
LYMPHOCYTES NFR BLD AUTO: 11.6 % (ref 15–50)
MAGNESIUM SERPL-MCNC: 1.8 MG/DL (ref 1.8–2.4)
MCH RBC QN AUTO: 28.6 PG (ref 26–34)
MCHC RBC AUTO-ENTMCNC: 34 G/DL (ref 31–37)
MCV RBC: 84.1 FL (ref 80–100)
MONOCYTES NFR BLD: 12.6 % (ref 2–11)
NEUTROPHILS # BLD: 3.6 10X3/UL (ref 1.78–5.38)
NEUTROPHILS NFR BLD AUTO: 73.4 % (ref 40–80)
OSMOLALITY SERPL CALC.SUM OF ELEC: 283 MOSM/KG (ref 275–300)
PLATELET # BLD: 333 10X3/UL (ref 130–400)
PMV BLD AUTO: 10.5 FL (ref 7.4–10.4)
POTASSIUM SERPL-SCNC: 3.4 MMOL/L (ref 3.5–5.1)
PROT SERPL-MCNC: 6.6 G/DL (ref 6.4–8.2)
RBC # BLD AUTO: 4.16 10X6/UL (ref 4.2–6.1)
SODIUM SERPL-SCNC: 139 MMOL/L (ref 136–145)
WBC # BLD AUTO: 4.9 10X3/UL (ref 4.8–10.8)

## 2021-04-09 NOTE — NUR
BEDTIME MEDS GIVEN. PT AWAKE, PULLING AT HIS COVERS. TUBINGS AND LINES
SECURED. NS @ 50ML/HR INFUSING.

## 2021-04-09 NOTE — MORECARE
CASE MANAGEMENT DISCHARGE SUMMARY
 
 
PATIENT: CHRIS HAYDEN                         UNIT: [id number]
ACCOUNT#: [id number]                       ADM DATE: 21
AGE: 70     : [birth year]  SEX: M            ROOM/BED: D.2108    
AUTHOR: TITI,DOC                             PHYSICIAN:                               
 
REFERRING PHYSICIAN: JEROME NIEVES MD                
DATE OF SERVICE: 21
Case Management Discharge Planning Summary
 
 
COMMENTS 
ENTERED DATE:  21   9:30 CT
COMMENT TYPE:  Discharge Planning
REVIEWER: Natali Becerra
CM called [facility] in [place] and spoke with Seema. She 
states she will tell Samaria and provide her my number. CM will continue to 
follow and assist with discharge planning/needs.
__________________________________________________
ENTERED DATE:  21   6:31 CT
COMMENT TYPE:  Discharge Planning
REVIEWER: Kassidy Ceballos
Late Entry 21  
  
CM received a message Marilia patient's sister of SNF [facility] she requested [facility] [phone] fax [phone] this facility 
in [place]. CM will send a referral. and follow up.
__________________________________________________
ENTERED DATE:  21   6:20 CT
COMMENT TYPE:  Discharge Planning
REVIEWER: Kassidy Ceballos
LATE ENTRY  21  
  
CM spoke  with patient's sister Marilia Forbes [phone]  to complete 
initial dc planning assessment.  Patient lives at home alone.. Patient is 
independent. At discharge patient will require placement to a SNF. CM 
discussed availability of home health, rehab services, and medical equipment.
  CM spoke to Marilia about SNF and she was going to look them up on website 
and speak with siblings and call CM back with [facility], CM will continue to 
follow and will assist as needed with dc plans/needs.
 
 
DCP REVIEW SUMMARY
ANTICIPATED D/C DATE: 
EXPECTED LOS : 
CASE STATUS:  DCP Initiated
INITIAL REVIEW:  3/29/2021
INITIAL REVIEWER:   Kassidy Ceballos
 
FINAL DISCHARGE DISPOSITION:  : 
FINAL REVIEWER:  
FINAL REVIEW DATE: 
 
  
 
DCP Focus Questions & Answers
 
DCP Screen
QUESTION: ANSWER
High Risk Factors: : Hosp related to CHF, COPD, DM, End Stage Ds, CVA, CA
 
 
 
DCP Evaluation
QUESTION: ANSWER
Patient and/or caregiver agree upon recommended discharge plan? : Yes
Patient's current cognitive status: : Confused
Patient's ability to cope with chronic illness : d. No chronic illness
Patient gives permission to discuss discharge plans with:  (name, 
relationship and number) : MARILIA FORBES [phone]   TASIA KLEIN 
[phone]
Family / Caregiver's ability to cope with chronic illness: : b. Minimal 
(occasionally not dependable to meet pt's. needs, can meet pt's. basic 
ADL's)
Alternate discharge plan (if recommended plan not agreed upon by patient 
and/or caregiver): : SNF -PLACEMENT
Does the patient have the ability to pay for or attain post discharge needs 
/ services? : Yes
Functional screen assessment: : Noticeable poor ADL management
Family / Caregiver's ability to cope with chronic illness: : b. Minimal 
(occasionally not dependable to meet pt's. needs, can meet pt's. basic 
ADL's)
Physical Status: : Independent with ADL's
Is there a likelihood that the patient will require additional services to 
return to the preadmission environment? : Yes
Living Arrangements: : Home Alone with Support
Results of this evaluation have been discussed with: : Family
Patient with capacity for self-care or can be cared for in same environment 
as prior to hospitalization? : No
Baseline cognitive status: : *Oriented to person, place, situation, time and 
present
Physical environment modification needed / anticipated for discharge: : Yes
Planned post hospital services available for patient? : Yes
Planned post hospital services covered by insurance plan? : Yes
Does Patient have transportation to get home and to follow-up medical 
appointments when discharged from the hospital? : Yes
Would patient like to participate in any Care Coordination programs (if 
applicable): : Not applicable
Does the patient have electricity at home? : Yes
Does the patient have running water in their house? : Yes
Mental health screen: : No mental health history
 
Resources / Services in place: : None
 
 
 
DCP Re-evaluation
QUESTION: ANSWER
Would patient like to participate in any Care Coordination programs (if 
applicable): : Not applicable
 
 
 
 
 
 
 
PATIENT:  CHRIS HAYDEN
ENCOUNTER:  [id number]
MEDICAL RECORD#:  [id number]
ADMISSION DATE:  2021
DISCHARGE DATE:  
ATTENDING MD:  JEROME MCCALL
:   
AGE:  70
MARITAL STATUS:   S
DC PLAN ID:  [id number]
FACILITY:   [facility]
PRINTED ON: 21   9:47  CT
 
 
 
 
 
 
 
 
**All edits/amendments must be made on the electronic document**
 
DICTATION DATE: 21     : MIKEL  21     
RPT#: [id number]                                DC DATE:        
                                               STATUS: ADM IN  
[facility]
 [place] [address]
***END OF REPORT***

## 2021-04-09 NOTE — MORECARE
CASE MANAGEMENT DISCHARGE SUMMARY
 
 
PATIENT: CHRIS HAYDEN                         UNIT: [id number]
ACCOUNT#: [id number]                       ADM DATE: 21
AGE: 70     : 54  SEX: M            ROOM/BED: D.2108    
AUTHOR: TITI,DOC                             PHYSICIAN:                               
 
REFERRING PHYSICIAN: JEROME NIEVES MD                
DATE OF SERVICE: 21
Case Management Discharge Planning Summary
 
 
COMMENTS 
ENTERED DATE:  21   6:31 CT
COMMENT TYPE:  Discharge Planning
REVIEWER: Kassidy Ceballos
Late Entry 21  
  
CM received a message Marilia patient's sister of SNF [facility] she requested [facility] [phone] fax [phone] this facility 
in [place]. CM will send a referral. and follow up.
__________________________________________________
ENTERED DATE:  21   6:20 CT
COMMENT TYPE:  Discharge Planning
REVIEWER: Kassidy Ceballos
LATE ENTRY  21  
  
CM spoke  with patient's sister Marilia Forbes [phone]  to complete 
initial dc planning assessment.  Patient lives at home alone.. Patient is 
independent. At discharge patient will require placement to a SNF. CM 
discussed availability of home health, rehab services, and medical equipment.
  CM spoke to Marilia about SNF and she was going to look them up on website 
and speak with siblings and call CM back with [facility], CM will continue to 
follow and will assist as needed with dc plans/needs.
 
 
DCP REVIEW SUMMARY
ANTICIPATED D/C DATE: 
EXPECTED LOS : 
CASE STATUS:  DCP Initiated
INITIAL REVIEW:  3/29/2021
INITIAL REVIEWER:   Kassidy Ceballos
FINAL DISCHARGE DISPOSITION:  : 
FINAL REVIEWER:  
FINAL REVIEW DATE: 
 
  
 
DCP Focus Questions & Answers
 
 
DCP Screen
QUESTION: ANSWER
High Risk Factors: : Hosp related to CHF, COPD, DM, End Stage Ds, CVA, CA
 
 
 
DCP Evaluation
QUESTION: ANSWER
Patient and/or caregiver agree upon recommended discharge plan? : Yes
Patient's current cognitive status: : Confused
Patient's ability to cope with chronic illness : d. No chronic illness
Patient gives permission to discuss discharge plans with:  (name, 
relationship and number) : MARILIA FORBES [phone]   TASIA KLEIN 
[phone]
Family / Caregiver's ability to cope with chronic illness: : b. Minimal 
(occasionally not dependable to meet pt's. needs, can meet pt's. basic 
ADL's)
Alternate discharge plan (if recommended plan not agreed upon by patient 
and/or caregiver): : SNF -PLACEMENT
Does the patient have the ability to pay for or attain post discharge needs 
/ services? : Yes
Functional screen assessment: : Noticeable poor ADL management
Family / Caregiver's ability to cope with chronic illness: : b. Minimal 
(occasionally not dependable to meet pt's. needs, can meet pt's. basic 
ADL's)
Physical Status: : Independent with ADL's
Is there a likelihood that the patient will require additional services to 
return to the preadmission environment? : Yes
Living Arrangements: : Home Alone with Support
Results of this evaluation have been discussed with: : Family
Patient with capacity for self-care or can be cared for in same environment 
as prior to hospitalization? : No
Baseline cognitive status: : *Oriented to person, place, situation, time and 
present
Physical environment modification needed / anticipated for discharge: : Yes
Planned post hospital services available for patient? : Yes
Planned post hospital services covered by insurance plan? : Yes
Does Patient have transportation to get home and to follow-up medical 
appointments when discharged from the hospital? : Yes
Would patient like to participate in any Care Coordination programs (if 
applicable): : Not applicable
Does the patient have electricity at home? : Yes
Does the patient have running water in their house? : Yes
Mental health screen: : No mental health history
Resources / Services in place: : None
 
 
 
DCP Re-evaluation
QUESTION: ANSWER
Would patient like to participate in any Care Coordination programs (if 
 
applicable): : Not applicable
 
 
 
 
 
 
 
PATIENT:  CHRIS HAYDEN
ENCOUNTER:  [id number]
MEDICAL RECORD#:  [id number]
ADMISSION DATE:  2021
DISCHARGE DATE:  
ATTENDING MD:  JEROME MCCALL
:   
AGE:  70
MARITAL STATUS:   S
DC PLAN ID:  [id number]
FACILITY:   [facility]
PRINTED ON: 21   9:34  CT
 
 
 
 
 
 
 
 
**All edits/amendments must be made on the electronic document**
 
DICTATION DATE: 21     : MIKEL  21     
RPT#: [id number]                                DC DATE:        
                                               STATUS: ADM IN  
[facility]
 [place] [address]
***END OF REPORT***

## 2021-04-09 NOTE — MORECARE
CASE MANAGEMENT DISCHARGE SUMMARY
 
 
PATIENT: CHRIS HAYDEN                         UNIT: [id number]
ACCOUNT#: [id number]                       ADM DATE: 21
AGE: 70     : 54  SEX: M            ROOM/BED: D.2108    
AUTHOR: TITIDOC                             PHYSICIAN:                               
 
REFERRING PHYSICIAN: JEROME NIEVES MD                
DATE OF SERVICE: 21
Case Management Discharge Planning Summary
 
 
 
 
 
DCP REVIEW SUMMARY
ANTICIPATED D/C DATE: 
EXPECTED LOS : 
CASE STATUS:  DCP Initiated
INITIAL REVIEW:  3/29/2021
INITIAL REVIEWER:   Kassidy Ceballos
FINAL DISCHARGE DISPOSITION:  : 
FINAL REVIEWER:  
FINAL REVIEW DATE: 
 
  
 
DCP Focus Questions & Answers
 
DCP Screen
QUESTION: ANSWER
High Risk Factors: : Hosp related to CHF, COPD, DM, End Stage Ds, CVA, CA
 
 
 
DCP Evaluation
QUESTION: ANSWER
Family / Caregiver's ability to cope with chronic illness: : b. Minimal 
(occasionally not dependable to meet pt's. needs, can meet pt's. basic 
ADL's)
Patient and/or caregiver agree upon recommended discharge plan? : Yes
Patient gives permission to discuss discharge plans with:  (name, 
relationship and number) : MARILIA GRIMESDDES [phone]   TASIA KLEIN 
[phone]
Patient's ability to cope with chronic illness : d. No chronic illness
Patient's current cognitive status: : Confused
Family / Caregiver's ability to cope with chronic illness: : b. Minimal 
(occasionally not dependable to meet pt's. needs, can meet pt's. basic 
ADL's)
 
Does the patient have the ability to pay for or attain post discharge needs 
/ services? : Yes
Alternate discharge plan (if recommended plan not agreed upon by patient 
and/or caregiver): : SNF -PLACEMENT
Physical Status: : Independent with ADL's
Functional screen assessment: : Noticeable poor ADL management
Living Arrangements: : Home Alone with Support
Is there a likelihood that the patient will require additional services to 
return to the preadmission environment? : Yes
Patient with capacity for self-care or can be cared for in same environment 
as prior to hospitalization? : No
Results of this evaluation have been discussed with: : Family
Baseline cognitive status: : *Oriented to person, place, situation, time and 
present
Physical environment modification needed / anticipated for discharge: : Yes
Planned post hospital services available for patient? : Yes
Planned post hospital services covered by insurance plan? : Yes
Does Patient have transportation to get home and to follow-up medical 
appointments when discharged from the hospital? : Yes
Would patient like to participate in any Care Coordination programs (if 
applicable): : Not applicable
Does the patient have electricity at home? : Yes
Does the patient have running water in their house? : Yes
Mental health screen: : No mental health history
Resources / Services in place: : None
 
 
 
DCP Re-evaluation
QUESTION: ANSWER
Would patient like to participate in any Care Coordination programs (if 
applicable): : Not applicable
 
 
 
 
 
 
 
PATIENT:  CHRIS HAYDEN
ENCOUNTER:  [id number]
MEDICAL RECORD#:  [id number]
ADMISSION DATE:  2021
DISCHARGE DATE:  
ATTENDING MD:  JEROME MCCALL
:   
AGE:  70
MARITAL STATUS:   S
DC PLAN ID:  [id number]
 
FACILITY:   [facility]
PRINTED ON: 21   6:25  CT
 
 
 
 
 
 
 
 
**All edits/amendments must be made on the electronic document**
 
DICTATION DATE: 21     : MIKEL  21     
RPT#: [id number]                                DC DATE:        
                                               STATUS: ADM IN  
[facility]
 [place] [address]
***END OF REPORT***

## 2021-04-09 NOTE — NUR
Nutrition Follow-up:
Nursing reports pt tolerating TF @ 50 mL/hr this AM with plans to increase to
goal rate today.
Diet: Osmolite 1.5 - goal rate 55 mL/hr + H2O flushes 25 mL q hr
No new wt; last wt: 151# (4/4)
Labs noted: K+ 3.4, Glu 134, Ca 8.0, Alb 2.6, Na 139
Meds noted: Thiamine, Folate, Pepcid, NS @ 50, electrolyte protocol
-Increase TF to goal rate as tolerated.
-Need new wt.
-RD follow-up: 4/12

## 2021-04-09 NOTE — MORECARE
CASE MANAGEMENT DISCHARGE SUMMARY
 
 
PATIENT: CHRIS HAYDEN                         UNIT: [id number]
ACCOUNT#: [id number]                       ADM DATE: 21
AGE: 70     : 54  SEX: M            ROOM/BED: D.2108    
AUTHOR: TITI,DOC                             PHYSICIAN:                               
 
REFERRING PHYSICIAN: JEROME NIEVES MD                
DATE OF SERVICE: 21
Case Management Discharge Planning Summary
 
 
COMMENTS 
ENTERED DATE:  21   9:57 CT
COMMENT TYPE:  Discharge Planning
REVIEWER: Natali Becerra CM spoke with Samaria with [facility] in [place] and I faxed 
clinical per request. CM will continue to follow and assist with discharge 
planning/needs.
__________________________________________________
ENTERED DATE:  21   9:30 CT
COMMENT TYPE:  Discharge Planning
REVIEWER: Natali Becerra CM called [facility] in [place] and spoke with Seema. She 
states she will tell Samaria and provide her my number. CM will continue to 
follow and assist with discharge planning/needs.
__________________________________________________
ENTERED DATE:  21   6:31 CT
COMMENT TYPE:  Discharge Planning
REVIEWER: Kassidy Ceballos
Late Entry 21  
  
CM received a message Marilia patient's sister of [facility] she requested [facility] [phone] fax [phone] this facility 
in [place]. CM will send a referral. and follow up.
__________________________________________________
ENTERED DATE:  21   6:20 CT
COMMENT TYPE:  Discharge Planning
REVIEWER: Kassidy Ceballos
LATE ENTRY  21  
  
CM spoke  with patient's sister Marilia Forbes [phone]  to complete 
initial dc planning assessment.  Patient lives at home alone.. Patient is 
independent. At discharge patient will require placement to a SNF. CM 
discussed availability of home health, rehab services, and medical equipment.
  CM spoke to Marilia about SNF and she was going to look them up on website 
and speak with siblings and call CM back with [facility], CM will continue to 
follow and will assist as needed with dc plans/needs.
 
 
 
DCP REVIEW SUMMARY
ANTICIPATED D/C DATE: 
EXPECTED LOS : 
CASE STATUS:  DCP Initiated
INITIAL REVIEW:  3/29/2021
INITIAL REVIEWER:   Kassidy Ceballos
FINAL DISCHARGE DISPOSITION:  : 
FINAL REVIEWER:  
FINAL REVIEW DATE: 
 
  
 
DCP Focus Questions & Answers
 
DCP Screen
QUESTION: ANSWER
High Risk Factors: : Hosp related to CHF, COPD, DM, End Stage Ds, CVA, CA
 
 
 
DCP Evaluation
QUESTION: ANSWER
Patient and/or caregiver agree upon recommended discharge plan? : Yes
Patient's current cognitive status: : Confused
Patient's ability to cope with chronic illness : d. No chronic illness
Patient gives permission to discuss discharge plans with:  (name, 
relationship and number) : MARILIA FORBES [phone]   TASIA KLEIN 
[phone]
Family / Caregiver's ability to cope with chronic illness: : b. Minimal 
(occasionally not dependable to meet pt's. needs, can meet pt's. basic 
ADL's)
Alternate discharge plan (if recommended plan not agreed upon by patient 
and/or caregiver): : SNF -PLACEMENT
Does the patient have the ability to pay for or attain post discharge needs 
/ services? : Yes
Functional screen assessment: : Noticeable poor ADL management
Family / Caregiver's ability to cope with chronic illness: : b. Minimal 
(occasionally not dependable to meet pt's. needs, can meet pt's. basic 
ADL's)
Physical Status: : Independent with ADL's
Is there a likelihood that the patient will require additional services to 
return to the preadmission environment? : Yes
Living Arrangements: : Home Alone with Support
Results of this evaluation have been discussed with: : Family
Patient with capacity for self-care or can be cared for in same environment 
as prior to hospitalization? : No
Baseline cognitive status: : *Oriented to person, place, situation, time and 
present
Physical environment modification needed / anticipated for discharge: : Yes
Planned post hospital services available for patient? : Yes
Planned post hospital services covered by insurance plan? : Yes
 
Does Patient have transportation to get home and to follow-up medical 
appointments when discharged from the hospital? : Yes
Would patient like to participate in any Care Coordination programs (if 
applicable): : Not applicable
Does the patient have electricity at home? : Yes
Does the patient have running water in their house? : Yes
Mental health screen: : No mental health history
Resources / Services in place: : None
 
 
 
DCP Re-evaluation
QUESTION: ANSWER
Would patient like to participate in any Care Coordination programs (if 
applicable): : Not applicable
 
 
 
 
 
 
 
PATIENT:  CHRIS HAYDEN
ENCOUNTER:  [id number]
MEDICAL RECORD#:  [id number]
ADMISSION DATE:  2021
DISCHARGE DATE:  
ATTENDING MD:  JEROME MCCALL
:   
AGE:  70
MARITAL STATUS:   S
DC PLAN ID:  [id number]
FACILITY:   [facility]
PRINTED ON: 21   9:59  CT
 
 
 
 
 
 
 
 
**All edits/amendments must be made on the electronic document**
 
DICTATION DATE: 21     : MIKEL  21     
RPT#: [id number]                                DC DATE:        
                                               STATUS: ADM IN  
[facility]
 [place] [address]
***END OF REPORT***

## 2021-04-09 NOTE — MORECARE
CASE MANAGEMENT DISCHARGE SUMMARY
 
 
PATIENT: CHRIS HAYDEN                         UNIT: [id number]
ACCOUNT#: [id number]                       ADM DATE: 21
AGE: 70     : 54  SEX: M            ROOM/BED: D.2108    
AUTHOR: TITI,DOC                             PHYSICIAN:                               
 
REFERRING PHYSICIAN: JEROME NIEVES MD                
DATE OF SERVICE: 21
Case Management Discharge Planning Summary
 
 
COMMENTS 
ENTERED DATE:  21   6:20 CT
COMMENT TYPE:  Discharge Planning
REVIEWER: Kassidy Ceballos
LATE ENTRY  21  
  
CM spoke  with patient's sister Marilia Forbes [phone]  to complete 
initial dc planning assessment.  Patient lives at home alone.. Patient is 
independent. At discharge patient will require placement to a SNF. CM 
discussed availability of home health, rehab services, and medical equipment.
  CM spoke to Marilia about SNF and she was going to look them up on website 
and speak with siblings and call CM back with [facility], CM will continue to 
follow and will assist as needed with dc plans/needs.
 
 
DCP REVIEW SUMMARY
ANTICIPATED D/C DATE: 
EXPECTED LOS : 
CASE STATUS:  DCP Initiated
INITIAL REVIEW:  3/29/2021
INITIAL REVIEWER:   Kassidy Ceballos
FINAL DISCHARGE DISPOSITION:  : 
FINAL REVIEWER:  
FINAL REVIEW DATE: 
 
  
 
DCP Focus Questions & Answers
 
DCP Screen
QUESTION: ANSWER
High Risk Factors: : Hosp related to CHF, COPD, DM, End Stage Ds, CVA, CA
 
 
 
DCP Evaluation
QUESTION: ANSWER
 
Patient and/or caregiver agree upon recommended discharge plan? : Yes
Patient's current cognitive status: : Confused
Patient's ability to cope with chronic illness : d. No chronic illness
Patient gives permission to discuss discharge plans with:  (name, 
relationship and number) : MARILIA FORBES [phone]   TASIALAURA KLEIN 
[phone]
Family / Caregiver's ability to cope with chronic illness: : b. Minimal 
(occasionally not dependable to meet pt's. needs, can meet pt's. basic 
ADL's)
Alternate discharge plan (if recommended plan not agreed upon by patient 
and/or caregiver): : SNF -PLACEMENT
Does the patient have the ability to pay for or attain post discharge needs 
/ services? : Yes
Functional screen assessment: : Noticeable poor ADL management
Family / Caregiver's ability to cope with chronic illness: : b. Minimal 
(occasionally not dependable to meet pt's. needs, can meet pt's. basic 
ADL's)
Physical Status: : Independent with ADL's
Is there a likelihood that the patient will require additional services to 
return to the preadmission environment? : Yes
Living Arrangements: : Home Alone with Support
Results of this evaluation have been discussed with: : Family
Patient with capacity for self-care or can be cared for in same environment 
as prior to hospitalization? : No
Baseline cognitive status: : *Oriented to person, place, situation, time and 
present
Physical environment modification needed / anticipated for discharge: : Yes
Planned post hospital services available for patient? : Yes
Planned post hospital services covered by insurance plan? : Yes
Does Patient have transportation to get home and to follow-up medical 
appointments when discharged from the hospital? : Yes
Would patient like to participate in any Care Coordination programs (if 
applicable): : Not applicable
Does the patient have electricity at home? : Yes
Does the patient have running water in their house? : Yes
Mental health screen: : No mental health history
Resources / Services in place: : None
 
 
 
DCP Re-evaluation
QUESTION: ANSWER
Would patient like to participate in any Care Coordination programs (if 
applicable): : Not applicable
 
 
 
 
 
 
 
PATIENT:  CHRIS HAYDEN
 
ENCOUNTER:  [id number]
MEDICAL RECORD#:  [id number]
ADMISSION DATE:  2021
DISCHARGE DATE:  
ATTENDING MD:  JEROME MCCALL
:   
AGE:  70
MARITAL STATUS:   S
DC PLAN ID:  [id number]
FACILITY:   [facility]
PRINTED ON: 21   6:36  CT
 
 
 
 
 
 
 
 
**All edits/amendments must be made on the electronic document**
 
DICTATION DATE: 21     : MIKEL  2136     
RPT#: [id number]                                DC DATE:        
                                               STATUS: ADM IN  
[facility]
 [place] [address]
***END OF REPORT***

## 2021-04-09 NOTE — NUR
REASSESED PT FOR PAIN . PT MORE COMFORTABLE. REPOSITIONED TO LEFT SIDE
PROVIDED ORAL CSRE. MOUTH VERY DRY. BED LOW ALARM IN PLACE CALL LIGHT WITHIN
REACH. WILL CONTINUE TO MONITOR.

## 2021-04-09 NOTE — MORECARE
CASE MANAGEMENT DISCHARGE SUMMARY
 
 
PATIENT: CHRIS HAYDEN                         UNIT: [id number]
ACCOUNT#: [id number]                       ADM DATE: 21
AGE: 70     : 54  SEX: M            ROOM/BED: D.2108    
AUTHOR: TITI,DOC                             PHYSICIAN:                               
 
REFERRING PHYSICIAN: JEROME NIEVES MD                
DATE OF SERVICE: 21
Case Management Discharge Planning Summary
 
 
COMMENTS 
ENTERED DATE:  21   6:31 CT
COMMENT TYPE:  Discharge Planning
REVIEWER: Kassidy Ceballos
Late Entry 21  
  
CM received a message Marilia patient's sister of SNF [facility] she requested [facility] [phone] fax [phone] this facility 
in [place]. CM will send a referral. and follow up.
__________________________________________________
ENTERED DATE:  21   6:20 CT
COMMENT TYPE:  Discharge Planning
REVIEWER: Kassidy Ceballos
LATE ENTRY  21  
  
CM spoke  with patient's sister Marilia Forbes [phone]  to complete 
initial dc planning assessment.  Patient lives at home alone.. Patient is 
independent. At discharge patient will require placement to a SNF. CM 
discussed availability of home health, rehab services, and medical equipment.
  CM spoke to Marilia about SNF and she was going to look them up on website 
and speak with siblings and call CM back with [facility], CM will continue to 
follow and will assist as needed with dc plans/needs.
 
 
DCP REVIEW SUMMARY
ANTICIPATED D/C DATE: 
EXPECTED LOS : 
CASE STATUS:  DCP Initiated
INITIAL REVIEW:  3/29/2021
INITIAL REVIEWER:   Kassidy Ceballos
FINAL DISCHARGE DISPOSITION:  : 
FINAL REVIEWER:  
FINAL REVIEW DATE: 
 
  
 
DCP Focus Questions & Answers
 
 
DCP Screen
QUESTION: ANSWER
High Risk Factors: : Hosp related to CHF, COPD, DM, End Stage Ds, CVA, CA
 
 
 
DCP Evaluation
QUESTION: ANSWER
Family / Caregiver's ability to cope with chronic illness: : b. Minimal 
(occasionally not dependable to meet pt's. needs, can meet pt's. basic 
ADL's)
Patient gives permission to discuss discharge plans with:  (name, 
relationship and number) : MARILIA FORBES [phone]   TASIA KLEIN 
[phone]
Patient's ability to cope with chronic illness : d. No chronic illness
Patient's current cognitive status: : Confused
Patient and/or caregiver agree upon recommended discharge plan? : Yes
Physical Status: : Independent with ADL's
Family / Caregiver's ability to cope with chronic illness: : b. Minimal 
(occasionally not dependable to meet pt's. needs, can meet pt's. basic 
ADL's)
Functional screen assessment: : Noticeable poor ADL management
Does the patient have the ability to pay for or attain post discharge needs 
/ services? : Yes
Alternate discharge plan (if recommended plan not agreed upon by patient 
and/or caregiver): : SNF -PLACEMENT
Living Arrangements: : Home Alone with Support
Is there a likelihood that the patient will require additional services to 
return to the preadmission environment? : Yes
Baseline cognitive status: : *Oriented to person, place, situation, time and 
present
Patient with capacity for self-care or can be cared for in same environment 
as prior to hospitalization? : No
Results of this evaluation have been discussed with: : Family
Physical environment modification needed / anticipated for discharge: : Yes
Planned post hospital services available for patient? : Yes
Does Patient have transportation to get home and to follow-up medical 
appointments when discharged from the hospital? : Yes
Planned post hospital services covered by insurance plan? : Yes
Would patient like to participate in any Care Coordination programs (if 
applicable): : Not applicable
Does the patient have electricity at home? : Yes
Does the patient have running water in their house? : Yes
Mental health screen: : No mental health history
Resources / Services in place: : None
 
 
 
DCP Re-evaluation
QUESTION: ANSWER
Would patient like to participate in any Care Coordination programs (if 
 
applicable): : Not applicable
 
 
 
 
 
 
 
PATIENT:  CHRIS HAYDEN
ENCOUNTER:  [id number]
MEDICAL RECORD#:  [id number]
ADMISSION DATE:  2021
DISCHARGE DATE:  
ATTENDING MD:  JEROME MCCALL
:   
AGE:  70
MARITAL STATUS:   S
DC PLAN ID:  [id number]
FACILITY:   [facility]
PRINTED ON: 21   6:46  CT
 
 
 
 
 
 
 
 
**All edits/amendments must be made on the electronic document**
 
DICTATION DATE: 21     : MIKEL  21     
RPT#: [id number]                                DC DATE:        
                                               STATUS: ADM IN  
[facility]
 [place] [address]
***END OF REPORT***

## 2021-04-09 NOTE — NUR
PT COMPLAINS OF DISCOMFORT. UNABLE TO SPECIFY LOCATION. PT A/C. VSS. BED LOW
CALL LIGHT WITHIN REACH. WILL GIVE PRN PAIN MED AND CONTINUE TO MONITOR.

## 2021-04-09 NOTE — MORECARE
CASE MANAGEMENT DISCHARGE SUMMARY
 
 
PATIENT: CHRIS HAYDEN                         UNIT: [id number]
ACCOUNT#: [id number]                       ADM DATE: 21
AGE: 70     : 54  SEX: M            ROOM/BED: D.2108    
AUTHOR: TITI,DOC                             PHYSICIAN:                               
 
REFERRING PHYSICIAN: JEROME NIEVES MD                
DATE OF SERVICE: 21
Case Management Discharge Planning Summary
 
 
 
 
 
DCP REVIEW SUMMARY
ANTICIPATED D/C DATE: 
EXPECTED LOS : 
CASE STATUS:  DCP Initiated
INITIAL REVIEW:  3/29/2021
INITIAL REVIEWER:   Kassidy Ceballos
FINAL DISCHARGE DISPOSITION:  : 
FINAL REVIEWER:  
FINAL REVIEW DATE: 
 
  
 
DCP Focus Questions & Answers
 
DCP Screen
QUESTION: ANSWER
High Risk Factors: : Hosp related to CHF, COPD, DM, End Stage Ds, CVA, CA
 
 
 
DCP Evaluation
QUESTION: ANSWER
Patient's ability to cope with chronic illness : d. No chronic illness
Would patient like to participate in any Care Coordination programs (if 
applicable): : Not applicable
Mental health screen: : No mental health history
 
 
 
DCP Re-evaluation
QUESTION: ANSWER
Would patient like to participate in any Care Coordination programs (if 
applicable): : Not applicable
 
 
 
 
 
 
 
 
PATIENT:  CHRIS HAYDEN
ENCOUNTER:  [id number]
MEDICAL RECORD#:  [id number]
ADMISSION DATE:  2021
DISCHARGE DATE:  
ATTENDING MD:  JEROME MCCALL
:   
AGE:  70
MARITAL STATUS:   S
DC PLAN ID:  [id number]
FACILITY:   [facility]
PRINTED ON: 21   6:15  CT
 
 
 
 
 
 
 
 
**All edits/amendments must be made on the electronic document**
 
DICTATION DATE: 21     : DM  21     
RPT#: [id number]                                DC DATE:        
                                               STATUS: ADM IN  
[facility]
 [place] [address]
***END OF REPORT***

## 2021-04-09 NOTE — NUR
PT RECEIVED SLEEPING IN BED.  WET FROM INCONTINENCE, CLEANED AND LINEN CHANGE.
MEDS GIVEN PER PEG.  POSITIONED FOR COMFORT.

## 2021-04-10 VITALS — SYSTOLIC BLOOD PRESSURE: 131 MMHG | DIASTOLIC BLOOD PRESSURE: 76 MMHG

## 2021-04-10 VITALS — DIASTOLIC BLOOD PRESSURE: 73 MMHG | SYSTOLIC BLOOD PRESSURE: 121 MMHG

## 2021-04-10 VITALS — SYSTOLIC BLOOD PRESSURE: 118 MMHG | DIASTOLIC BLOOD PRESSURE: 70 MMHG

## 2021-04-10 VITALS — SYSTOLIC BLOOD PRESSURE: 127 MMHG | DIASTOLIC BLOOD PRESSURE: 64 MMHG

## 2021-04-10 VITALS — SYSTOLIC BLOOD PRESSURE: 119 MMHG | DIASTOLIC BLOOD PRESSURE: 68 MMHG

## 2021-04-10 VITALS — SYSTOLIC BLOOD PRESSURE: 171 MMHG | DIASTOLIC BLOOD PRESSURE: 91 MMHG

## 2021-04-10 LAB
ALBUMIN SERPL-MCNC: 2.6 G/DL (ref 3.4–5)
ALP SERPL-CCNC: 87 U/L (ref 30–120)
ALT SERPL-CCNC: 24 U/L (ref 10–68)
ANION GAP SERPL CALC-SCNC: 15.4 MMOL/L (ref 8–16)
BASOPHILS NFR BLD AUTO: 0.6 % (ref 0–2)
BILIRUB SERPL-MCNC: 0.29 MG/DL (ref 0.2–1.3)
BUN SERPL-MCNC: 21 MG/DL (ref 7–18)
CALCIUM SERPL-MCNC: 8.1 MG/DL (ref 8.5–10.1)
CHLORIDE SERPL-SCNC: 107 MMOL/L (ref 98–107)
CO2 SERPL-SCNC: 20.5 MMOL/L (ref 21–32)
CREAT SERPL-MCNC: 0.9 MG/DL (ref 0.6–1.3)
EOSINOPHIL NFR BLD: 2.2 % (ref 0–7)
ERYTHROCYTE [DISTWIDTH] IN BLOOD BY AUTOMATED COUNT: 13.7 % (ref 11.5–14.5)
GLOBULIN SER-MCNC: 3.4 G/L
GLUCOSE SERPL-MCNC: 101 MG/DL (ref 74–106)
HCT VFR BLD CALC: 36.8 % (ref 42–54)
HGB BLD-MCNC: 12.5 G/DL (ref 13.5–17.5)
IMM GRANULOCYTES NFR BLD: 0.6 % (ref 0–5)
LYMPHOCYTES # BLD: 0.63 10X3/UL (ref 1.32–3.57)
LYMPHOCYTES NFR BLD AUTO: 12.5 % (ref 15–50)
MAGNESIUM SERPL-MCNC: 1.9 MG/DL (ref 1.8–2.4)
MCH RBC QN AUTO: 29.2 PG (ref 26–34)
MCHC RBC AUTO-ENTMCNC: 34 G/DL (ref 31–37)
MCV RBC: 86 FL (ref 80–100)
MONOCYTES NFR BLD: 18.3 % (ref 2–11)
NEUTROPHILS # BLD: 3.31 10X3/UL (ref 1.78–5.38)
NEUTROPHILS NFR BLD AUTO: 65.8 % (ref 40–80)
OSMOLALITY SERPL CALC.SUM OF ELEC: 280 MOSM/KG (ref 275–300)
PLATELET # BLD: 286 10X3/UL (ref 130–400)
PMV BLD AUTO: 10.3 FL (ref 7.4–10.4)
POTASSIUM SERPL-SCNC: 3.9 MMOL/L (ref 3.5–5.1)
PROT SERPL-MCNC: 6 G/DL (ref 6.4–8.2)
RBC # BLD AUTO: 4.28 10X6/UL (ref 4.2–6.1)
SODIUM SERPL-SCNC: 139 MMOL/L (ref 136–145)
WBC # BLD AUTO: 5 10X3/UL (ref 4.8–10.8)

## 2021-04-10 NOTE — NUR
INITIAL ROUNDS COMPLETED.  PT RESTING WITH EYES CLOSED.  RESP EVEN AND
REGULAR.  SR UP X2, CALL LIGHT WITHIN REACH.

## 2021-04-10 NOTE — NUR
pt resting peacefully as he HAS BEEN ALL DAY. EYES CLOSED, BREATHS EVEN
REGULAR AND UNLABORED NO SIGNS OR SYMPTOMS OF ACUTE DISTRESS NOTED AT THIS
TIME. CL IN REACH, SRX2.

## 2021-04-10 NOTE — NUR
PT RESTING COMFORTABLY, EYES CLOSED BREATHS EVEN REGULAR AND UNLABORED. NO
SIGNS OR SYMPTOMS OF ACUTE DISTRESS NOTED AT THIS TIME. CL IN REACH,S RX2, BED
ALARM ON AND ACTIVE WNL.

## 2021-04-10 NOTE — NUR
ASSESSMENT COMPLETED [id number] HRS.  VSS.  SR PER CM HR 74.  PT ALERT, ORIENTED
TO PERSON ONLY.  PT MUMBLES.  IV TO RFA WITH NS AT 50CC/HR IV PATENT.  LUNGS
DIMINISHED IN BASES BILAT.  MARTINS.  PALPABLE PERIPHERAL PULSES.  OSMOLYTE 1.5
INFUSING VIA PEG AT 50CC/HR.  PEG PATENT.  DRSG TO LLE CLEAN,DRY AND INTACT.
PM MED GIVEN VIA PEG.  15CC RESIDUAL NOTED TO PEG TUBE.  PT CURRENTLY RESTING
WITH EYES CLOSED.  RESP EVEN AND REGULAR.  SR UP X2, CALL LIGHT WITHIN REACH
AND BED ALARM ON.

## 2021-04-10 NOTE — NUR
PT INCONTINENT OF URINE.  INCONTINENT CARE DONE.  PT REPOSITONED ONTO L SIDE.
NEW ENTERAL FEEDING BAG HUNG WITH OSMOLYTE 1.5 AT 50CC/HR.  PT TOLERATED WELL.
 PT CURRENTLY WATCHING TV.  SR UP X2, CALL LIGHT WITHIN REACH AND BED ALARM
ON.

## 2021-04-11 VITALS — DIASTOLIC BLOOD PRESSURE: 82 MMHG | SYSTOLIC BLOOD PRESSURE: 171 MMHG

## 2021-04-11 VITALS — DIASTOLIC BLOOD PRESSURE: 59 MMHG | SYSTOLIC BLOOD PRESSURE: 149 MMHG

## 2021-04-11 VITALS — SYSTOLIC BLOOD PRESSURE: 122 MMHG | DIASTOLIC BLOOD PRESSURE: 60 MMHG

## 2021-04-11 VITALS — DIASTOLIC BLOOD PRESSURE: 78 MMHG | SYSTOLIC BLOOD PRESSURE: 161 MMHG

## 2021-04-11 VITALS — DIASTOLIC BLOOD PRESSURE: 69 MMHG | SYSTOLIC BLOOD PRESSURE: 136 MMHG

## 2021-04-11 VITALS — SYSTOLIC BLOOD PRESSURE: 152 MMHG | DIASTOLIC BLOOD PRESSURE: 72 MMHG

## 2021-04-11 VITALS — SYSTOLIC BLOOD PRESSURE: 128 MMHG | DIASTOLIC BLOOD PRESSURE: 59 MMHG

## 2021-04-11 LAB
ALBUMIN SERPL-MCNC: 2.5 G/DL (ref 3.4–5)
ALP SERPL-CCNC: 90 U/L (ref 30–120)
ALT SERPL-CCNC: 22 U/L (ref 10–68)
ANION GAP SERPL CALC-SCNC: 7.8 MMOL/L (ref 8–16)
BASOPHILS NFR BLD AUTO: 0.5 % (ref 0–2)
BILIRUB SERPL-MCNC: 0.3 MG/DL (ref 0.2–1.3)
BUN SERPL-MCNC: 22 MG/DL (ref 7–18)
CALCIUM SERPL-MCNC: 8.4 MG/DL (ref 8.5–10.1)
CHLORIDE SERPL-SCNC: 105 MMOL/L (ref 98–107)
CO2 SERPL-SCNC: 28.7 MMOL/L (ref 21–32)
CREAT SERPL-MCNC: 1 MG/DL (ref 0.6–1.3)
EOSINOPHIL NFR BLD: 2.9 % (ref 0–7)
ERYTHROCYTE [DISTWIDTH] IN BLOOD BY AUTOMATED COUNT: 13.6 % (ref 11.5–14.5)
GLOBULIN SER-MCNC: 3.9 G/L
GLUCOSE SERPL-MCNC: 151 MG/DL (ref 74–106)
HCT VFR BLD CALC: 33.7 % (ref 42–54)
HGB BLD-MCNC: 11.2 G/DL (ref 13.5–17.5)
IMM GRANULOCYTES NFR BLD: 0.7 % (ref 0–5)
LYMPHOCYTES # BLD: 0.59 10X3/UL (ref 1.32–3.57)
LYMPHOCYTES NFR BLD AUTO: 14 % (ref 15–50)
MAGNESIUM SERPL-MCNC: 2 MG/DL (ref 1.8–2.4)
MCH RBC QN AUTO: 28.6 PG (ref 26–34)
MCHC RBC AUTO-ENTMCNC: 33.2 G/DL (ref 31–37)
MCV RBC: 86.2 FL (ref 80–100)
MONOCYTES NFR BLD: 13.8 % (ref 2–11)
NEUTROPHILS # BLD: 2.87 10X3/UL (ref 1.78–5.38)
NEUTROPHILS NFR BLD AUTO: 68.1 % (ref 40–80)
OSMOLALITY SERPL CALC.SUM OF ELEC: 281 MOSM/KG (ref 275–300)
PLATELET # BLD: 302 10X3/UL (ref 130–400)
PMV BLD AUTO: 10.5 FL (ref 7.4–10.4)
POTASSIUM SERPL-SCNC: 3.5 MMOL/L (ref 3.5–5.1)
PROT SERPL-MCNC: 6.4 G/DL (ref 6.4–8.2)
RBC # BLD AUTO: 3.91 10X6/UL (ref 4.2–6.1)
SODIUM SERPL-SCNC: 138 MMOL/L (ref 136–145)
WBC # BLD AUTO: 4.2 10X3/UL (ref 4.8–10.8)

## 2021-04-11 NOTE — NUR
PT AWAKE AND CONFUSED. LYING IN BED. PEG TUBE WORKING WNL, MEDS GIVEN WITHOUT
DIFFICULTY. PT COMPLAINING OF PAIN, AMDINISTERED PRN TYLENOL. CLEANED AND
DRIED PT, CHANGED LINNENS. APPLIED CONDOM CATHETER. CURRENTLY IN PLACE. PT
BACK TO RESTING COMFROTABLY, EYES CLOSED. BREATHS EVEN/REGULAR AND UNLABORED.
CL IN REACH, SRX2, BED ALARM ON AND ACTIVE.

## 2021-04-11 NOTE — NUR
PT ALERT AND ORIENTED, LYING IN BED WATCHING T/V. CONDOM CATH IN PLACE AND
WORKING. I/V INFILTRATED, 1 STCK TO THE RFA 20G. CL IN REACH,SRX2.

## 2021-04-11 NOTE — NUR
PT INCONTINENT OF URINE.  INCONTINENT CARE DONE.  20CC RESIDUAL NOTED TO PEG
TUBE.  VSS THROUGHOUT NIGHT.  NEEDS MET; WILL CONTINUE TO MONITOR.

## 2021-04-11 NOTE — NUR
INITIAL ROUNDS COMPLETED AT 1905 HRS.  NO DISTRESS NOTED.  PT REQUESTING PAIN
MEDS.  INFORMED PT NEXT PAIN MED DUE AT 2300 HRS.  PT GRUMBLED UNDER HIS
BEATH.  ASSESSMENT COMPLETED AT 1955 HRS. SR PER CM HR 61.  PT ALERT, ORIENTED
TO PERSON AND PLACE.  REORIENTED TO TIME AND SITUATION.  IV TO RFA EITH NS AT
50CC/HR.  IV PATENT.  LUNGS DIMINISHED IN BASES BILAT.  MARTINS.  PALPABLE
PERIPHERAL PULSES.  ABD WITH AVTIVE BS NOTED.  PEG TUBE WITH OSMOLYTE 1.5 AT
50CC/HR.  15CC RESIDUAL NOTED.  BINDER IN PLACE.  CONDOM CATH DRAINING YELLOW
URINE.  PM MEDS GIVEN VIA PEG WITHOUT PROBLEMS.  REPOSITIONED PT ONTO L SIDE.
PT CURRENTLY WATCHING TV.  SR UP X2, CALL LIGHT WITHIN REACH.

## 2021-04-11 NOTE — NUR
22 GAUGE IV PLACED TO RIGHT LATERAL FOREARM X 1 STICK, GOOD BLOOD RETURN, EASY
FLUSH. TAPED, DATED, AND SECURED. PATIENT TOLERATED IV PLACEMENT WELL.
PATIENTS NURSE ANDRY MADE AWARE OF SUCCESSFULL IV PLACEMENT.

## 2021-04-11 NOTE — NUR
PT AWAKE AND CONFUSED. CONTINUOUSLY YELLING LOUDLY AND SAYING HE CAN'T GET
COMFORTABLE. REARRANGED PT MULTIPLE TIMES AND PT WILL STATE THAT IT HELPED AND
THE VERY MINUTE WE EXIT THE ROOM, PT BEGINS SCREAMING THAT  HE IS
UNCOMFORATABLE. CONDOMM CATHETER IN TACT, DRAINING WELL. CL IN REACH, SRX2,
BED ALARM ON AND ACTIVE.

## 2021-04-11 NOTE — NUR
PT INCONTINENT OF URINE.  INCONTINENT CARE DONE.  PT REPOSITIONED IN BED.  SR
UP X3, CALL LIGHT WITHIN REACH.

## 2021-04-12 VITALS — DIASTOLIC BLOOD PRESSURE: 73 MMHG | SYSTOLIC BLOOD PRESSURE: 137 MMHG

## 2021-04-12 VITALS — SYSTOLIC BLOOD PRESSURE: 199 MMHG | DIASTOLIC BLOOD PRESSURE: 100 MMHG

## 2021-04-12 VITALS — SYSTOLIC BLOOD PRESSURE: 205 MMHG | DIASTOLIC BLOOD PRESSURE: 96 MMHG

## 2021-04-12 VITALS — SYSTOLIC BLOOD PRESSURE: 163 MMHG | DIASTOLIC BLOOD PRESSURE: 90 MMHG

## 2021-04-12 LAB
ALBUMIN SERPL-MCNC: 2.5 G/DL (ref 3.4–5)
ALP SERPL-CCNC: 88 U/L (ref 30–120)
ALT SERPL-CCNC: 23 U/L (ref 10–68)
ANION GAP SERPL CALC-SCNC: 9.9 MMOL/L (ref 8–16)
BASOPHILS NFR BLD AUTO: 0.6 % (ref 0–2)
BILIRUB SERPL-MCNC: 0.29 MG/DL (ref 0.2–1.3)
BUN SERPL-MCNC: 19 MG/DL (ref 7–18)
CALCIUM SERPL-MCNC: 8 MG/DL (ref 8.5–10.1)
CHLORIDE SERPL-SCNC: 102 MMOL/L (ref 98–107)
CO2 SERPL-SCNC: 26.4 MMOL/L (ref 21–32)
CREAT SERPL-MCNC: 0.9 MG/DL (ref 0.6–1.3)
EOSINOPHIL NFR BLD: 1.7 % (ref 0–7)
ERYTHROCYTE [DISTWIDTH] IN BLOOD BY AUTOMATED COUNT: 13.4 % (ref 11.5–14.5)
GLOBULIN SER-MCNC: 3.3 G/L
GLUCOSE SERPL-MCNC: 121 MG/DL (ref 74–106)
HCT VFR BLD CALC: 32.8 % (ref 42–54)
HGB BLD-MCNC: 11 G/DL (ref 13.5–17.5)
IMM GRANULOCYTES NFR BLD: 0.4 % (ref 0–5)
LYMPHOCYTES # BLD: 0.6 10X3/UL (ref 1.32–3.57)
LYMPHOCYTES NFR BLD AUTO: 12.6 % (ref 15–50)
MCH RBC QN AUTO: 28.5 PG (ref 26–34)
MCHC RBC AUTO-ENTMCNC: 33.5 G/DL (ref 31–37)
MCV RBC: 85 FL (ref 80–100)
MONOCYTES NFR BLD: 12.8 % (ref 2–11)
NEUTROPHILS # BLD: 3.41 10X3/UL (ref 1.78–5.38)
NEUTROPHILS NFR BLD AUTO: 71.9 % (ref 40–80)
OSMOLALITY SERPL CALC.SUM OF ELEC: 270 MOSM/KG (ref 275–300)
PLATELET # BLD: 341 10X3/UL (ref 130–400)
PMV BLD AUTO: 10.9 FL (ref 7.4–10.4)
POTASSIUM SERPL-SCNC: 4.3 MMOL/L (ref 3.5–5.1)
PROT SERPL-MCNC: 5.8 G/DL (ref 6.4–8.2)
RBC # BLD AUTO: 3.86 10X6/UL (ref 4.2–6.1)
SODIUM SERPL-SCNC: 134 MMOL/L (ref 136–145)
WBC # BLD AUTO: 4.8 10X3/UL (ref 4.8–10.8)

## 2021-04-12 NOTE — NUR
INTRODUCED MYSELF TO PT.  HE HAS HAD A CVA WITH LEFT SIDE WEAKNESS.  HIS
SPEECH IS GARBLED BUT HE UNDERSTANDS WHAT IS BEING SAID.  HE ONLY GETS UP WITH
PT.  PT AT TIMES HAS PERIODS OF CONFUSION.  HIS IV IS IN THE RIGHT FOREARM
WITH NS AT 50ML/HR.  HE HAS A PEG WITH OSMALYTE IN FUSING.  HE HAS A CONDOM
CATH IN PLACE.  HE HAS LABLED BANDAIDS ON HIS LOWER EXTREMITIES.  HE HAS AN
EGG CRATE MATTRESS FOR COMFORT.

## 2021-04-12 NOTE — NUR
SPOKE WITH [facility].  INFORMED OF PT'S C/O PAIN.  INFOMRED OF PT'S
CONDITION AND UPDATE GIVEN.  NEW ORDERS RECEIVED.

## 2021-04-12 NOTE — NUR
PT DEMANDING PAIN MEDS;  INFORMED PT NOT DUE UNTIL 0500.  PT BECAME
BELLIGERENT.  ALEX GORMAN RN CHARGE IN ROOM.

## 2021-04-12 NOTE — NUR
VSS THROUGHOUT NIGHT.  PT RESTED WELL AFTER NORCO ADMINISTRATION.  NEEDS MET;
WILL CONTINUE TO MONITOR.

## 2021-04-12 NOTE — MORECARE
CASE MANAGEMENT DISCHARGE SUMMARY
 
 
PATIENT: CHRIS HAYDEN                         UNIT: [id number]
ACCOUNT#: [id number]                       ADM DATE: 21
AGE: 70     : 54  SEX: M            ROOM/BED: D.2108    
AUTHOR: TITI,DOC                             PHYSICIAN:                               
 
REFERRING PHYSICIAN: JEROME NIEVES MD                
DATE OF SERVICE: 21
Case Management Discharge Planning Summary
 
 
COMMENTS 
ENTERED DATE:  21  11:51 CT
COMMENT TYPE:  Discharge Planning
REVIEWER: Natali Becerra CM spoke with Samaria with [facility] in [place] and Samaria 
states they will not have a bed until at ase Wednesday. I called patient's 
sister to inform her and left a message on her answering machine to return 
my call. Samaria states that they have sister facilities in [facility] and [facility] that she could refer to if the sister 
desires. I will speak with the sister about another choice for SNF.
__________________________________________________
ENTERED DATE:  21   9:57 CT
COMMENT TYPE:  Discharge Planning
REVIEWER: Natali Becerra CM spoke with Samaria with [facility] in [place] and I faxed 
clinical per request. CM will continue to follow and assist with discharge 
planning/needs.
__________________________________________________
ENTERED DATE:  21   9:30 CT
COMMENT TYPE:  Discharge Planning
REVIEWER: Natali Becerra CM called [facility] in [place] and spoke with Seema. She 
states she will tell Samaria and provide her my number. CM will continue to 
follow and assist with discharge planning/needs.
__________________________________________________
ENTERED DATE:  21   6:31 CT
COMMENT TYPE:  Discharge Planning
REVIEWER: Kassidy Ceballos
Late Entry 21  
  
CM received a message Marilia patient's sister of SNF [facility] she requested [facility] [phone] fax [phone] this facility 
in [place]. CM will send a referral. and follow up.
__________________________________________________
ENTERED DATE:  21   6:20 CT
COMMENT TYPE:  Discharge Planning
REVIEWER: Kassidy Ceballos
 
LATE ENTRY  21  
  
CM spoke  with patient's sister Marilia Forbes [phone]  to complete 
initial dc planning assessment.  Patient lives at home alone.. Patient is 
independent. At discharge patient will require placement to a SNF. CM 
discussed availability of home health, rehab services, and medical equipment.
  CM spoke to Marilia about SNF and she was going to look them up on website 
and speak with siblings and call CM back with [facility], CM will continue to 
follow and will assist as needed with dc plans/needs.
 
 
DCP REVIEW SUMMARY
ANTICIPATED D/C DATE: 
EXPECTED LOS : 
CASE STATUS:  DCP Initiated
INITIAL REVIEW:  3/29/2021
INITIAL REVIEWER:   Kassidy Ceballos
FINAL DISCHARGE DISPOSITION:  : 
FINAL REVIEWER:  
FINAL REVIEW DATE: 
 
  
 
DCP Focus Questions & Answers
 
DCP Screen
QUESTION: ANSWER
High Risk Factors: : Hosp related to CHF, COPD, DM, End Stage Ds, CVA, CA
 
 
 
DCP Evaluation
QUESTION: ANSWER
Patient and/or caregiver agree upon recommended discharge plan? : Yes
Family / Caregiver's ability to cope with chronic illness: : b. Minimal 
(occasionally not dependable to meet pt's. needs, can meet pt's. basic 
ADL's)
Patient's current cognitive status: : Confused
Patient gives permission to discuss discharge plans with:  (name, 
relationship and number) : MARILIA FORBES [phone]   TASIA KLEIN 
[phone]
Patient's ability to cope with chronic illness : d. No chronic illness
Alternate discharge plan (if recommended plan not agreed upon by patient 
and/or caregiver): : SNF -PLACEMENT
Does the patient have the ability to pay for or attain post discharge needs 
/ services? : Yes
Functional screen assessment: : Noticeable poor ADL management
Family / Caregiver's ability to cope with chronic illness: : b. Minimal 
(occasionally not dependable to meet pt's. needs, can meet pt's. basic 
ADL's)
Physical Status: : Independent with ADL's
Is there a likelihood that the patient will require additional services to 
 
return to the preadmission environment? : Yes
Living Arrangements: : Home Alone with Support
Results of this evaluation have been discussed with: : Family
Patient with capacity for self-care or can be cared for in same environment 
as prior to hospitalization? : No
Baseline cognitive status: : *Oriented to person, place, situation, time and 
present
Physical environment modification needed / anticipated for discharge: : Yes
Planned post hospital services available for patient? : Yes
Planned post hospital services covered by insurance plan? : Yes
Does Patient have transportation to get home and to follow-up medical 
appointments when discharged from the hospital? : Yes
Would patient like to participate in any Care Coordination programs (if 
applicable): : Not applicable
Does the patient have electricity at home? : Yes
Does the patient have running water in their house? : Yes
Mental health screen: : No mental health history
Resources / Services in place: : None
 
 
 
DCP Re-evaluation
QUESTION: ANSWER
Would patient like to participate in any Care Coordination programs (if 
applicable): : Not applicable
 
 
 
 
 
 
 
PATIENT:  CHRIS HAYDEN
ENCOUNTER:  [id number]
MEDICAL RECORD#:  [id number]
ADMISSION DATE:  2021
DISCHARGE DATE:  
ATTENDING MD:  JEROME MCCALL
:   
AGE:  70
MARITAL STATUS:   S
DC PLAN ID:  [id number]
 
FACILITY:   [facility]
PRINTED ON: 21  11:54  CT
 
 
 
 
 
 
 
 
**All edits/amendments must be made on the electronic document**
 
DICTATION DATE: 21 1154     : DM  21 1154     
RPT#: [id number]                                DC DATE:        
                                               STATUS: ADM IN  
[facility]
 [place] [address]
***END OF REPORT***

## 2021-04-12 NOTE — NUR
NORCO 5/325 GIVEN VIA PEG TUBE.  PT PULLED OFF CONDOM CATH.  NEW CONDOM CATH
APPLIED.  SR UP X3, CALL LIGHT WITHIN REACH.

## 2021-04-12 NOTE — NUR
PT AWAKE AND ORIENTED, EXPRESSES THAT HE IS IN A FOWL MOOD AND FRUSTRATED WITH
HIS LACK OF ACTIVITY. STATES THAT HE IS UPSET THAT HE CAN NOT EAT AND IS
HUNGRY. CALLED SPEECH THERAPY FOR A REEVALUATION, TO ASSESS ABILITY TO CHANGE
DIET. REPLACED CONDOM CATHETER, WORKING WELL AT THIS TIME. CL IN REACH, SRX2,
BED ALARM ON AND ACTIVE.

## 2021-04-12 NOTE — MORECARE
CASE MANAGEMENT DISCHARGE SUMMARY
 
 
PATIENT: CHRIS HAYDEN                         UNIT: [id number]
ACCOUNT#: [id number]                       ADM DATE: 21
AGE: 70     : 54  SEX: M            ROOM/BED: D.2108    
AUTHOR: TITI,DOC                             PHYSICIAN:                               
 
REFERRING PHYSICIAN: JEROME NIEVES MD                
DATE OF SERVICE: 21
Case Management Discharge Planning Summary
 
 
COMMENTS 
ENTERED DATE:  21  14:29 CT
COMMENT TYPE:  Discharge Planning
REVIEWER: Natali Mariam
Patient's daughter calls back and declines another choice at this time. She 
wants to see if patient can be discharged Wednesday to [facility] 
in [place]. I have informed Bibi.
__________________________________________________
ENTERED DATE:  21  11:51 CT
COMMENT TYPE:  Discharge Planning
REVIEWER: Natali Becerra CM spoke with Samaria with [facility] in [place] and Samaria 
states they will not have a bed until at lease Wednesday. I called patient's 
sister to inform her and left a message on her answering machine to return 
my call. Samaria states that they have sister facilities in [facility] and [facility] that she could refer to if the sister 
desires. I will speak with the sister about another choice for SNF.
__________________________________________________
ENTERED DATE:  21   9:57 CT
COMMENT TYPE:  Discharge Planning
REVIEWER: Natali Becerra CM spoke with Samaria with [facility] in [place] and I faxed 
clinical per request. CM will continue to follow and assist with discharge 
planning/needs.
__________________________________________________
ENTERED DATE:  21   9:30 CT
COMMENT TYPE:  Discharge Planning
REVIEWER: Natali Becerra CM called [facility] in [place] and spoke with Seema. She 
states she will tell Samaria and provide her my number. CM will continue to 
follow and assist with discharge planning/needs.
__________________________________________________
ENTERED DATE:  21   6:31 CT
COMMENT TYPE:  Discharge Planning
REVIEWER: Kassidy Ceballos
Late Entry 21  
  
 
CM received a message Marilia patient's sister of SNF [facility] she requested [facility] [phone] fax [phone] this facility 
in [place]. CM will send a referral. and follow up.
__________________________________________________
ENTERED DATE:  21   6:20 CT
COMMENT TYPE:  Discharge Planning
REVIEWER: Kassidy Ceballos
LATE ENTRY  21  
  
CM spoke  with patient's sister Marilia Forbes [phone]  to complete 
initial dc planning assessment.  Patient lives at home alone.. Patient is 
independent. At discharge patient will require placement to a SNF. CM 
discussed availability of home health, rehab services, and medical equipment.
  CM spoke to Marilia about SNF and she was going to look them up on website 
and speak with siblings and call CM back with [facility], CM will continue to 
follow and will assist as needed with dc plans/needs.
 
 
DCP REVIEW SUMMARY
ANTICIPATED D/C DATE: 
EXPECTED LOS : 
CASE STATUS:  DCP Initiated
INITIAL REVIEW:  3/29/2021
INITIAL REVIEWER:   Kassidy Ceballos
FINAL DISCHARGE DISPOSITION:  : 
FINAL REVIEWER:  
FINAL REVIEW DATE: 
 
  
 
DCP Focus Questions & Answers
 
DCP Screen
QUESTION: ANSWER
High Risk Factors: : Hosp related to CHF, COPD, DM, End Stage Ds, CVA, CA
 
 
 
DCP Evaluation
QUESTION: ANSWER
Patient and/or caregiver agree upon recommended discharge plan? : Yes
Family / Caregiver's ability to cope with chronic illness: : b. Minimal 
(occasionally not dependable to meet pt's. needs, can meet pt's. basic 
ADL's)
Patient's current cognitive status: : Confused
Patient gives permission to discuss discharge plans with:  (name, 
relationship and number) : MARILIA FORBES [phone]   TASIA KLEIN 
[phone]
Patient's ability to cope with chronic illness : d. No chronic illness
Alternate discharge plan (if recommended plan not agreed upon by patient 
and/or caregiver): : SNF -PLACEMENT
Does the patient have the ability to pay for or attain post discharge needs 
 
/ services? : Yes
Functional screen assessment: : Noticeable poor ADL management
Family / Caregiver's ability to cope with chronic illness: : b. Minimal 
(occasionally not dependable to meet pt's. needs, can meet pt's. basic 
ADL's)
Physical Status: : Independent with ADL's
Is there a likelihood that the patient will require additional services to 
return to the preadmission environment? : Yes
Living Arrangements: : Home Alone with Support
Results of this evaluation have been discussed with: : Family
Patient with capacity for self-care or can be cared for in same environment 
as prior to hospitalization? : No
Baseline cognitive status: : *Oriented to person, place, situation, time and 
present
Physical environment modification needed / anticipated for discharge: : Yes
Planned post hospital services available for patient? : Yes
Planned post hospital services covered by insurance plan? : Yes
Does Patient have transportation to get home and to follow-up medical 
appointments when discharged from the hospital? : Yes
Would patient like to participate in any Care Coordination programs (if 
applicable): : Not applicable
Does the patient have electricity at home? : Yes
Does the patient have running water in their house? : Yes
Mental health screen: : No mental health history
Resources / Services in place: : None
 
 
 
DCP Re-evaluation
QUESTION: ANSWER
Would patient like to participate in any Care Coordination programs (if 
applicable): : Not applicable
 
 
 
 
 
 
 
PATIENT:  CHRIS HAYDEN
ENCOUNTER:  [id number]
MEDICAL RECORD#:  [id number]
ADMISSION DATE:  2021
DISCHARGE DATE:  
ATTENDING MD:  JEROME MCCALL
:   
AGE:  70
MARITAL STATUS:   S
DC PLAN ID:  [id number]
 
FACILITY:   [facility]
PRINTED ON: 21  14:33  CT
 
 
 
 
 
 
 
 
**All edits/amendments must be made on the electronic document**
 
DICTATION DATE: 21 5433     : MIKEL  21 1433     
RPT#: [id number]                                DC DATE:        
                                               STATUS: ADM IN  
[facility]
 [place] [address]
***END OF REPORT***

## 2021-04-12 NOTE — NUR
Nutrition Reassessment/Follow-up:
TF running @ 50 mL/hr. Discussed increasing to goal rate with nursing. Nursing
also reports pt upset about not eating. ST james this AM recs mech soft with
nectar thick liquids.
Diet: Regular, Mech Soft, Nectar Thick Liquids
   Osmolite 1.5 - goal rate 55 mL/hr + H2O flushes 25 mL q hr
No new wt; last wt: 151# (4/4)
Labs noted: Glu 151, Ca 8.4, Alb 2.5
Meds noted: Thiamine, Folate, Pepcid, NS @ 50, electrolyte protocol
-Nutrition needs unchanged; no new wt available.
-Depending upon PO intake, may be able to d/c TF.
-Need new wt.
-RD will follow up within 2-3 days.

## 2021-04-13 VITALS — DIASTOLIC BLOOD PRESSURE: 61 MMHG | SYSTOLIC BLOOD PRESSURE: 126 MMHG

## 2021-04-13 VITALS — SYSTOLIC BLOOD PRESSURE: 133 MMHG | DIASTOLIC BLOOD PRESSURE: 76 MMHG

## 2021-04-13 VITALS — SYSTOLIC BLOOD PRESSURE: 191 MMHG | DIASTOLIC BLOOD PRESSURE: 93 MMHG

## 2021-04-13 VITALS — DIASTOLIC BLOOD PRESSURE: 92 MMHG | SYSTOLIC BLOOD PRESSURE: 172 MMHG

## 2021-04-13 VITALS — DIASTOLIC BLOOD PRESSURE: 86 MMHG | SYSTOLIC BLOOD PRESSURE: 164 MMHG

## 2021-04-13 LAB
ALBUMIN SERPL-MCNC: 2.9 G/DL (ref 3.4–5)
ALP SERPL-CCNC: 108 U/L (ref 30–120)
ALT SERPL-CCNC: 25 U/L (ref 10–68)
ANION GAP SERPL CALC-SCNC: 10 MMOL/L (ref 8–16)
BASOPHILS NFR BLD AUTO: 0.3 % (ref 0–2)
BILIRUB SERPL-MCNC: 0.41 MG/DL (ref 0.2–1.3)
BUN SERPL-MCNC: 15 MG/DL (ref 7–18)
CALCIUM SERPL-MCNC: 8.3 MG/DL (ref 8.5–10.1)
CHLORIDE SERPL-SCNC: 104 MMOL/L (ref 98–107)
CO2 SERPL-SCNC: 25.3 MMOL/L (ref 21–32)
CREAT SERPL-MCNC: 1 MG/DL (ref 0.6–1.3)
EOSINOPHIL NFR BLD: 1.2 % (ref 0–7)
ERYTHROCYTE [DISTWIDTH] IN BLOOD BY AUTOMATED COUNT: 13.4 % (ref 11.5–14.5)
GLOBULIN SER-MCNC: 4.3 G/L
GLUCOSE SERPL-MCNC: 161 MG/DL (ref 74–106)
HCT VFR BLD CALC: 34.6 % (ref 42–54)
HGB BLD-MCNC: 11.7 G/DL (ref 13.5–17.5)
IMM GRANULOCYTES NFR BLD: 0.2 % (ref 0–5)
LYMPHOCYTES # BLD: 0.53 10X3/UL (ref 1.32–3.57)
LYMPHOCYTES NFR BLD AUTO: 9.2 % (ref 15–50)
MCH RBC QN AUTO: 28.8 PG (ref 26–34)
MCHC RBC AUTO-ENTMCNC: 33.8 G/DL (ref 31–37)
MCV RBC: 85.2 FL (ref 80–100)
MONOCYTES NFR BLD: 9.2 % (ref 2–11)
NEUTROPHILS # BLD: 4.57 10X3/UL (ref 1.78–5.38)
NEUTROPHILS NFR BLD AUTO: 79.9 % (ref 40–80)
OSMOLALITY SERPL CALC.SUM OF ELEC: 273 MOSM/KG (ref 275–300)
PLATELET # BLD: 363 10X3/UL (ref 130–400)
PMV BLD AUTO: 10.7 FL (ref 7.4–10.4)
POTASSIUM SERPL-SCNC: 4.3 MMOL/L (ref 3.5–5.1)
PROT SERPL-MCNC: 7.2 G/DL (ref 6.4–8.2)
RBC # BLD AUTO: 4.06 10X6/UL (ref 4.2–6.1)
SODIUM SERPL-SCNC: 135 MMOL/L (ref 136–145)
WBC # BLD AUTO: 5.7 10X3/UL (ref 4.8–10.8)

## 2021-04-13 NOTE — NUR
Pt resting in bed with eyes closed, no distress noted. Pt awoke with ease and
was cleaned, changed and repositioned for comfort. Assessment and vitals per
flow sheet, meds per MAR.

## 2021-04-13 NOTE — NUR
PT PEG TUBE TUBING WAS CHANGED.  PT WAS WIPED OFF WELL AFTER CONDOM CATH CAME
OFF AND SOAKED THE PT.  HE FELT MUCH BETTER AFTER BEING CLEANED UP.   PT TRIES
VERY HARD TO TALK BUT YOU HAVE TO LISTEN CLOSELY TO HEAR HIM.  HE IS
FRUSTRATED WITH NOT BEING ABLE TO USE LEFT HAND AND LEG.  HE HAS NO NEW C/O OR
PROBLEMS.

## 2021-04-13 NOTE — NUR
PT LAYING IN BED RESTING COMFORTABLE, DENIES PAIN AT THIS TIME, POSITIONED FOR
COMFORT, WILL MONITOR

## 2021-04-13 NOTE — NUR
CHARMAINE CARE PROVIDED AND CLOTTED BLOOD NOTED AROUND RECTUM, DR NIEVES NOTIFIED
AND DR ELLIS CONSULTED, WILL MONITOR

## 2021-04-14 VITALS — SYSTOLIC BLOOD PRESSURE: 163 MMHG | DIASTOLIC BLOOD PRESSURE: 64 MMHG

## 2021-04-14 VITALS — DIASTOLIC BLOOD PRESSURE: 76 MMHG | SYSTOLIC BLOOD PRESSURE: 152 MMHG

## 2021-04-14 VITALS — SYSTOLIC BLOOD PRESSURE: 116 MMHG | DIASTOLIC BLOOD PRESSURE: 76 MMHG

## 2021-04-14 VITALS — SYSTOLIC BLOOD PRESSURE: 131 MMHG | DIASTOLIC BLOOD PRESSURE: 83 MMHG

## 2021-04-14 LAB
ALBUMIN SERPL-MCNC: 3.2 G/DL (ref 3.4–5)
ALP SERPL-CCNC: 104 U/L (ref 30–120)
ALT SERPL-CCNC: 27 U/L (ref 10–68)
ANION GAP SERPL CALC-SCNC: 14.9 MMOL/L (ref 8–16)
BASOPHILS NFR BLD AUTO: 0.3 % (ref 0–2)
BILIRUB SERPL-MCNC: 0.41 MG/DL (ref 0.2–1.3)
BUN SERPL-MCNC: 18 MG/DL (ref 7–18)
CALCIUM SERPL-MCNC: 8.9 MG/DL (ref 8.5–10.1)
CHLORIDE SERPL-SCNC: 102 MMOL/L (ref 98–107)
CO2 SERPL-SCNC: 24.3 MMOL/L (ref 21–32)
CREAT SERPL-MCNC: 1 MG/DL (ref 0.6–1.3)
EOSINOPHIL NFR BLD: 0.9 % (ref 0–7)
ERYTHROCYTE [DISTWIDTH] IN BLOOD BY AUTOMATED COUNT: 13.6 % (ref 11.5–14.5)
GLOBULIN SER-MCNC: 4 G/L
GLUCOSE SERPL-MCNC: 146 MG/DL (ref 74–106)
HCT VFR BLD CALC: 35.8 % (ref 42–54)
HGB BLD-MCNC: 12 G/DL (ref 13.5–17.5)
IMM GRANULOCYTES NFR BLD: 0.3 % (ref 0–5)
LYMPHOCYTES # BLD: 0.78 10X3/UL (ref 1.32–3.57)
LYMPHOCYTES NFR BLD AUTO: 10.3 % (ref 15–50)
MCH RBC QN AUTO: 28.5 PG (ref 26–34)
MCHC RBC AUTO-ENTMCNC: 33.5 G/DL (ref 31–37)
MCV RBC: 85 FL (ref 80–100)
MONOCYTES NFR BLD: 10.1 % (ref 2–11)
NEUTROPHILS # BLD: 5.89 10X3/UL (ref 1.78–5.38)
NEUTROPHILS NFR BLD AUTO: 78.1 % (ref 40–80)
OSMOLALITY SERPL CALC.SUM OF ELEC: 276 MOSM/KG (ref 275–300)
PLATELET # BLD: 419 10X3/UL (ref 130–400)
PMV BLD AUTO: 10.8 FL (ref 7.4–10.4)
POTASSIUM SERPL-SCNC: 5.2 MMOL/L (ref 3.5–5.1)
PROT SERPL-MCNC: 7.2 G/DL (ref 6.4–8.2)
RBC # BLD AUTO: 4.21 10X6/UL (ref 4.2–6.1)
SODIUM SERPL-SCNC: 136 MMOL/L (ref 136–145)
WBC # BLD AUTO: 7.5 10X3/UL (ref 4.8–10.8)

## 2021-04-14 NOTE — NUR
Nutrition Follow-up:
Nursing reports pt tolerating TF @ 55 mL/hr; not eating much. ST following.
Noted K+ elevated; will monitor. Awaiting placement.
Diet: Osmolite @ 55 mL/hr + H2O flushes 25 mL q hr
      Regular, Puree, Nectar Thick Liquids
No new wt; last wt: 151# (4/4)
Labs noted: K+ 5.2, Glu 146, Alb 3.2
Meds noted: Thiamine, Folate, Pepcid, NS @ 50, electrolyte protocol
-Continue TF as tolerated.
-Need new wt.
-RD follow-up: 4/16

## 2021-04-14 NOTE — NUR
Lying in bed, awake/alert/confused, speech slurred/garbled, T/R freq for C/C,
incont of B/B with use of incont pads ad meg, G-tube placement checked via
A/A with no residual noted, Osmolyte infusing via pump @ 50 mL/hr, feeding
increased to 55 per order, 100 mL H20 flush q 8 hr, no s/s of pain exhibited
such as grimacing/grunting/groaning, call light/phone within reach, no s/s of
acute distress observed at this time.

## 2021-04-14 NOTE — NUR
PATIENT WAS MAX ASST X2 TO GET UP TO BEDSIDE. PATIENT NEEDED MAX ASST TO STAY
SITTING UP RIGHT. PATIENT WAS MAX ASST X 2 TO LAY BACK IN BED.

## 2021-04-15 VITALS — DIASTOLIC BLOOD PRESSURE: 70 MMHG | SYSTOLIC BLOOD PRESSURE: 154 MMHG

## 2021-04-15 VITALS — DIASTOLIC BLOOD PRESSURE: 60 MMHG | SYSTOLIC BLOOD PRESSURE: 135 MMHG

## 2021-04-15 LAB
ALBUMIN SERPL-MCNC: 2.8 G/DL (ref 3.4–5)
ALP SERPL-CCNC: 94 U/L (ref 30–120)
ALT SERPL-CCNC: 23 U/L (ref 10–68)
ANION GAP SERPL CALC-SCNC: 13.5 MMOL/L (ref 8–16)
BASOPHILS NFR BLD AUTO: 0.4 % (ref 0–2)
BILIRUB SERPL-MCNC: 0.36 MG/DL (ref 0.2–1.3)
BUN SERPL-MCNC: 23 MG/DL (ref 7–18)
CALCIUM SERPL-MCNC: 8.1 MG/DL (ref 8.5–10.1)
CHLORIDE SERPL-SCNC: 105 MMOL/L (ref 98–107)
CO2 SERPL-SCNC: 24.9 MMOL/L (ref 21–32)
CREAT SERPL-MCNC: 1 MG/DL (ref 0.6–1.3)
EOSINOPHIL NFR BLD: 1 % (ref 0–7)
ERYTHROCYTE [DISTWIDTH] IN BLOOD BY AUTOMATED COUNT: 13.6 % (ref 11.5–14.5)
GLOBULIN SER-MCNC: 4 G/L
GLUCOSE SERPL-MCNC: 113 MG/DL (ref 74–106)
HCT VFR BLD CALC: 32.5 % (ref 42–54)
HGB BLD-MCNC: 10.7 G/DL (ref 13.5–17.5)
IMM GRANULOCYTES NFR BLD: 0.1 % (ref 0–5)
LYMPHOCYTES # BLD: 0.68 10X3/UL (ref 1.32–3.57)
LYMPHOCYTES NFR BLD AUTO: 8.9 % (ref 15–50)
MCH RBC QN AUTO: 28.2 PG (ref 26–34)
MCHC RBC AUTO-ENTMCNC: 32.9 G/DL (ref 31–37)
MCV RBC: 85.8 FL (ref 80–100)
MONOCYTES NFR BLD: 8.6 % (ref 2–11)
NEUTROPHILS # BLD: 6.21 10X3/UL (ref 1.78–5.38)
NEUTROPHILS NFR BLD AUTO: 81 % (ref 40–80)
OSMOLALITY SERPL CALC.SUM OF ELEC: 282 MOSM/KG (ref 275–300)
PLATELET # BLD: 416 10X3/UL (ref 130–400)
PMV BLD AUTO: 10.8 FL (ref 7.4–10.4)
POTASSIUM SERPL-SCNC: 4.4 MMOL/L (ref 3.5–5.1)
PROT SERPL-MCNC: 6.8 G/DL (ref 6.4–8.2)
RBC # BLD AUTO: 3.79 10X6/UL (ref 4.2–6.1)
SODIUM SERPL-SCNC: 139 MMOL/L (ref 136–145)
WBC # BLD AUTO: 7.7 10X3/UL (ref 4.8–10.8)

## 2021-04-15 NOTE — MORECARE
CASE MANAGEMENT DISCHARGE SUMMARY
 
 
PATIENT: CHRIS HAYDEN                         UNIT: [id number]
ACCOUNT#: [id number]                       ADM DATE: 21
AGE: 70     : 54  SEX: M            ROOM/BED: D.2108    
AUTHOR: TITI,DOC                             PHYSICIAN:                               
 
REFERRING PHYSICIAN: JEROME NIEVES MD                
DATE OF SERVICE: 04/15/21
Case Management Discharge Planning Summary
 
 
COMMENTS 
ENTERED DATE:  4/15/21  11:49 CT
COMMENT TYPE:  Discharge Planning
REVIEWER: Natali Becerra
DC today to [facility] to a skilled bed via ambulance.   
Address: [address] [place], [place] [address]  
Accepting Dr Giovanni Helm
__________________________________________________
ENTERED DATE:  4/15/21  10:17 CT
COMMENT TYPE:  Discharge Planning
REVIEWER: Natali Mera with [facility] called and states they will accept 
patient to a skilled bed. He will need to transfer via non urgent ambulance. 
I faxed updated clinical. She will review and call me when ready for him. I 
informed Catie Thomas.
__________________________________________________
ENTERED DATE:  4/15/21   1:26 CT
COMMENT TYPE:  Discharge Planning
REVIEWER: Kassidy Ceballos
LATE ENTRY 21  
  
CM called [facility] and left message with Vickie Milan regarding 
possible acceptance on 21  
  
CM called twice and left message with Vickie Milan at [facility]. CM 
called back third time and left a message with Samaria Hassan.  CM never 
received a return callback. CM will continue to follow and assist as needed.
__________________________________________________
ENTERED DATE:  21  14:29 CT
COMMENT TYPE:  Discharge Planning
REVIEWER: Natali Becerra
Patient's daughter calls back and declines another choice at this time. She 
wants to see if patient can be discharged Wednesday to [facility] 
in [place]. I have informed Bibi.
 
__________________________________________________
ENTERED DATE:  21  11:51 CT
COMMENT TYPE:  Discharge Planning
REVIEWER: Natali Becerra CM spoke with Samaria with [facility] in [place] and Samaria 
states they will not have a bed until at lease Wednesday. I called patient's 
sister to inform her and left a message on her answering machine to return 
my call. Samaria states that they have sister facilities in [facility] and [facility] that she could refer to if the sister 
desires. I will speak with the sister about another choice for SNF.
__________________________________________________
ENTERED DATE:  21   9:57 CT
COMMENT TYPE:  Discharge Planning
REVIEWER: Natali Becerra CM spoke with Samaria with [facility] in [place] and I faxed 
clinical per request. CM will continue to follow and assist with discharge 
planning/needs.
__________________________________________________
ENTERED DATE:  21   9:30 CT
COMMENT TYPE:  Discharge Planning
REVIEWER: Natali Becerra CM called [facility] in [place] and spoke with Seema. She 
states she will tell Samaria and provide her my number. CM will continue to 
follow and assist with discharge planning/needs.
__________________________________________________
ENTERED DATE:  21   6:31 CT
COMMENT TYPE:  Discharge Planning
REVIEWER: Kassidy Ceballos
Late Entry 21  
  
CM received a message Marilia patient's sister of SNF [facility] she requested [facility] [phone] fax [phone] this facility 
in [place]. CM will send a referral. and follow up.
__________________________________________________
ENTERED DATE:  21   6:20 CT
COMMENT TYPE:  Discharge Planning
REVIEWER: Kassidy Ceballos
LATE ENTRY  21  
  
CM spoke  with patient's sister Marilia Forbes [phone]  to complete 
initial dc planning assessment.  Patient lives at home alone.. Patient is 
independent. At discharge patient will require placement to a SNF. CM 
discussed availability of home health, rehab services, and medical equipment.
  CM spoke to Marilia about SNF and she was going to look them up on website 
and speak with siblings and call CM back with [facility], CM will continue to 
follow and will assist as needed with dc plans/needs.
 
 
DCP REVIEW SUMMARY
ANTICIPATED D/C DATE: 
EXPECTED LOS : 
CASE STATUS:  DCP Initiated
 
INITIAL REVIEW:  3/29/2021
INITIAL REVIEWER:   Kassidy Ceballos
FINAL DISCHARGE DISPOSITION:  : 
FINAL REVIEWER:  
FINAL REVIEW DATE: 
 
  
 
DCP Focus Questions & Answers
 
DCP Screen
QUESTION: ANSWER
High Risk Factors: : Hosp related to CHF, COPD, DM, End Stage Ds, CVA, CA
 
 
 
DCP Evaluation
QUESTION: ANSWER
Patient and/or caregiver agree upon recommended discharge plan? : Yes
Family / Caregiver's ability to cope with chronic illness: : b. Minimal 
(occasionally not dependable to meet pt's. needs, can meet pt's. basic 
ADL's)
Patient's current cognitive status: : Confused
Patient gives permission to discuss discharge plans with:  (name, 
relationship and number) : MARILIA FORBES [phone]   TASIA KLEIN 
[phone]
Patient's ability to cope with chronic illness : d. No chronic illness
Alternate discharge plan (if recommended plan not agreed upon by patient 
and/or caregiver): : SNF -PLACEMENT
Does the patient have the ability to pay for or attain post discharge needs 
/ services? : Yes
Functional screen assessment: : Noticeable poor ADL management
Family / Caregiver's ability to cope with chronic illness: : b. Minimal 
(occasionally not dependable to meet pt's. needs, can meet pt's. basic 
ADL's)
Physical Status: : Independent with ADL's
Is there a likelihood that the patient will require additional services to 
return to the preadmission environment? : Yes
Living Arrangements: : Home Alone with Support
Results of this evaluation have been discussed with: : Family
Patient with capacity for self-care or can be cared for in same environment 
as prior to hospitalization? : No
Baseline cognitive status: : *Oriented to person, place, situation, time and 
present
Physical environment modification needed / anticipated for discharge: : Yes
Planned post hospital services available for patient? : Yes
Planned post hospital services covered by insurance plan? : Yes
Does Patient have transportation to get home and to follow-up medical 
appointments when discharged from the hospital? : Yes
Would patient like to participate in any Care Coordination programs (if 
applicable): : Not applicable
Does the patient have electricity at home? : Yes
 
Does the patient have running water in their house? : Yes
Mental health screen: : No mental health history
Resources / Services in place: : None
 
 
 
DCP Re-evaluation
QUESTION: ANSWER
Would patient like to participate in any Care Coordination programs (if 
applicable): : Not applicable
 
 
 
 
 
 
 
PATIENT:  CHRIS HAYDEN
ENCOUNTER:  [id number]
MEDICAL RECORD#:  [id number]
ADMISSION DATE:  2021
DISCHARGE DATE:  
ATTENDING MD:  JEROME MCCALL
:   
AGE:  70
MARITAL STATUS:   S
DC PLAN ID:  [id number]
FACILITY:   [facility]
PRINTED ON: 4/15/21  11:59  CT
 
 
 
 
 
 
 
 
**All edits/amendments must be made on the electronic document**
 
DICTATION DATE: 04/15/21 1159     : DM  04/15/21 1159     
RPT#: [id number]                                DC DATE:        
                                               STATUS: ADM IN  
[facility]
 [place] [address]
***END OF REPORT***

## 2021-04-15 NOTE — MORECARE
CASE MANAGEMENT DISCHARGE SUMMARY
 
 
PATIENT: CHRIS HAYDEN                         UNIT: [id number]
ACCOUNT#: [id number]                       ADM DATE: 21
AGE: 70     : 54  SEX: M            ROOM/BED: D.2108    
AUTHOR: TITI,DOC                             PHYSICIAN:                               
 
REFERRING PHYSICIAN: JEROME NIEVES MD                
DATE OF SERVICE: 04/15/21
Case Management Discharge Planning Summary
 
 
COMMENTS 
ENTERED DATE:  4/15/21  10:17 CT
COMMENT TYPE:  Discharge Planning
REVIEWER: Natali Mera with [facility] called and states they will accept 
patient to a skilled bed. He will need to transfer via non urgent ambulance. 
I faxed updated clinical. She will review and call me when ready for him. I 
informed Catie Thomas.
__________________________________________________
ENTERED DATE:  4/15/21   1:26 CT
COMMENT TYPE:  Discharge Planning
REVIEWER: Kassidy Ceballos
LATE ENTRY 21  
  
CM called [facility] and left message with Vickie Milan regarding 
possible acceptance on 21  
  
CM called twice and left message with Vickie Milan at [facility]. CM 
called back third time and left a message with Samaria Hassan.  CM never 
received a return callback. CM will continue to follow and assist as needed.
__________________________________________________
ENTERED DATE:  21  14:29 CT
COMMENT TYPE:  Discharge Planning
REVIEWER: Natali Morillorachel
Patient's daughter calls back and declines another choice at this time. She 
wants to see if patient can be discharged Wednesday to [facility] 
in [place]. I have informed Bibi.
__________________________________________________
ENTERED DATE:  21  11:51 CT
COMMENT TYPE:  Discharge Planning
REVIEWER: Natali Becerra CM spoke with Saamria with [facility] in [place] and Samaria 
states they will not have a bed until at . I called patient's 
sister to inform her and left a message on her answering machine to return 
 
my call. Samaria states that they have sister facilities in [facility] and [facility] that she could refer to if the sister 
desires. I will speak with the sister about another choice for SNF.
__________________________________________________
ENTERED DATE:  21   9:57 CT
COMMENT TYPE:  Discharge Planning
REVIEWER: Natali Becerra CM spoke with Samaria with [facility] in [place] and I faxed 
clinical per request. CM will continue to follow and assist with discharge 
planning/needs.
__________________________________________________
ENTERED DATE:  21   9:30 CT
COMMENT TYPE:  Discharge Planning
REVIEWER: Natali Becerra CM called [facility] in [place] and spoke with Seema. She 
states she will tell Samaria and provide her my number. CM will continue to 
follow and assist with discharge planning/needs.
__________________________________________________
ENTERED DATE:  21   6:31 CT
COMMENT TYPE:  Discharge Planning
REVIEWER: Kassidy Ceballos
Late Entry 21  
  
CM received a message Marilia patient's sister of SN[facility] she requested [facility] [phone] fax [phone] this facility 
in [place]. CM will send a referral. and follow up.
__________________________________________________
ENTERED DATE:  21   6:20 CT
COMMENT TYPE:  Discharge Planning
REVIEWER: Kassidy Ceballos
LATE ENTRY  21  
  
CM spoke  with patient's sister Marilia Forbes [phone]  to complete 
initial dc planning assessment.  Patient lives at home alone.. Patient is 
independent. At discharge patient will require placement to a SNF. CM 
discussed availability of home health, rehab services, and medical equipment.
  CM spoke to Marilia about SNF and she was going to look them up on website 
and speak with siblings and call CM back with [facility], CM will continue to 
follow and will assist as needed with dc plans/needs.
 
 
DCP REVIEW SUMMARY
ANTICIPATED D/C DATE: 
EXPECTED LOS : 
CASE STATUS:  DCP Initiated
INITIAL REVIEW:  3/29/2021
INITIAL REVIEWER:   Kassidy Ceballos
FINAL DISCHARGE DISPOSITION:  : 
FINAL REVIEWER:  
FINAL REVIEW DATE: 
 
  
 
 
DCP Focus Questions & Answers
 
DCP Screen
QUESTION: ANSWER
High Risk Factors: : Hosp related to CHF, COPD, DM, End Stage Ds, CVA, CA
 
 
 
DCP Evaluation
QUESTION: ANSWER
Patient and/or caregiver agree upon recommended discharge plan? : Yes
Family / Caregiver's ability to cope with chronic illness: : b. Minimal 
(occasionally not dependable to meet pt's. needs, can meet pt's. basic 
ADL's)
Patient's current cognitive status: : Confused
Patient gives permission to discuss discharge plans with:  (name, 
relationship and number) : MARILIA FORBES [phone]   TASIA KLEIN 
[phone]
Patient's ability to cope with chronic illness : d. No chronic illness
Alternate discharge plan (if recommended plan not agreed upon by patient 
and/or caregiver): : SNF -PLACEMENT
Does the patient have the ability to pay for or attain post discharge needs 
/ services? : Yes
Functional screen assessment: : Noticeable poor ADL management
Family / Caregiver's ability to cope with chronic illness: : b. Minimal 
(occasionally not dependable to meet pt's. needs, can meet pt's. basic 
ADL's)
Physical Status: : Independent with ADL's
Is there a likelihood that the patient will require additional services to 
return to the preadmission environment? : Yes
Living Arrangements: : Home Alone with Support
Results of this evaluation have been discussed with: : Family
Patient with capacity for self-care or can be cared for in same environment 
as prior to hospitalization? : No
Baseline cognitive status: : *Oriented to person, place, situation, time and 
present
Physical environment modification needed / anticipated for discharge: : Yes
Planned post hospital services available for patient? : Yes
Planned post hospital services covered by insurance plan? : Yes
Does Patient have transportation to get home and to follow-up medical 
appointments when discharged from the hospital? : Yes
Would patient like to participate in any Care Coordination programs (if 
applicable): : Not applicable
Does the patient have electricity at home? : Yes
Does the patient have running water in their house? : Yes
Mental health screen: : No mental health history
Resources / Services in place: : None
 
 
 
DCP Re-evaluation
 
QUESTION: ANSWER
Would patient like to participate in any Care Coordination programs (if 
applicable): : Not applicable
 
 
 
 
 
 
 
PATIENT:  CHRIS HAYDEN
ENCOUNTER:  [id number]
MEDICAL RECORD#:  [id number]
ADMISSION DATE:  2021
DISCHARGE DATE:  
ATTENDING MD:  JEROME MCCALL
:   
AGE:  70
MARITAL STATUS:   S
DC PLAN ID:  [id number]
FACILITY:   [facility]
PRINTED ON: 4/15/21  10:20  CT
 
 
 
 
 
 
 
 
**All edits/amendments must be made on the electronic document**
 
DICTATION DATE: 04/15/21 1020     : MIKEL  04/15/21 1020     
RPT#: [id number]                                DC DATE:        
                                               STATUS: ADM IN  
[facility]
 [place] [address]
***END OF REPORT***

## 2021-04-15 NOTE — NUR
Lying in bed, awake/alert/confused, speech is
slurred/garbled/mumbles/difficult to understand, left arm/hand are contracted,
T/R frequently for C/C, incont of B/B with use of incont pads ad meg, pericare
provided with daily bath and PRN, no s/s of pain/other discomfort such as
grunting/groaning/grimacing observed, G-Tube placement checked via A/A without
issues, residual of 10 mL observed, feeding of Osmolyte infusing via pump at
rate of 55 mL/hr with H2O flush of 25 mL/hr, tolerating well, no s/s of acute
distress observed.

## 2021-04-15 NOTE — MORECARE
CASE MANAGEMENT DISCHARGE SUMMARY
 
 
PATIENT: CHRIS HAYDEN                         UNIT: [id number]
ACCOUNT#: [id number]                       ADM DATE: 21
AGE: 70     : 54  SEX: M            ROOM/BED: D.2108    
AUTHOR: TITI,DOC                             PHYSICIAN:                               
 
REFERRING PHYSICIAN: JEROME NIEVES MD                
DATE OF SERVICE: 04/15/21
Case Management Discharge Planning Summary
 
 
COMMENTS 
ENTERED DATE:  4/15/21   1:26 CT
COMMENT TYPE:  Discharge Planning
REVIEWER: Kassidy Ceballos
LATE ENTRY 21  
  
CM called [facility] and left message with Foldees regarding 
possible acceptance on 21  
  
CM called twice and left message with Vickie Kona [employer] at [facility]. CM 
called back third time and left a message with Samaria Hassan.  CM never 
received a return callback. CM will continue to follow and assist as needed.
__________________________________________________
ENTERED DATE:  21  14:29 CT
COMMENT TYPE:  Discharge Planning
REVIEWER: Natali Morillorachel
Patient's daughter calls back and declines another choice at this time. She 
wants to see if patient can be discharged Wednesday to [facility] 
in [place]. I have informed Bibi.
__________________________________________________
ENTERED DATE:  21  11:51 CT
COMMENT TYPE:  Discharge Planning
REVIEWER: Natali Mariam
CM spoke with Samaria with [facility] in [place] and Samaria 
states they will not have a bed until at lease Wednesday. I called patient's 
sister to inform her and left a message on her answering machine to return 
my call. Samaria states that they have sister facilities in [facility] and [facility] that she could refer to if the sister 
desires. I will speak with the sister about another choice for SNF.
__________________________________________________
ENTERED DATE:  21   9:57 CT
COMMENT TYPE:  Discharge Planning
REVIEWER: Natali Becerra CM spoke with Samaria with [facility] in [place] and I faxed 
 
clinical per request. CM will continue to follow and assist with discharge 
planning/needs.
__________________________________________________
ENTERED DATE:  21   9:30 CT
COMMENT TYPE:  Discharge Planning
REVIEWER: Natali Becerra CM called [facility] in [place] and spoke with Seema. She 
states she will tell Samaria and provide her my number. CM will continue to 
follow and assist with discharge planning/needs.
__________________________________________________
ENTERED DATE:  21   6:31 CT
COMMENT TYPE:  Discharge Planning
REVIEWER: Kassidy Ceballos
Late Entry 21  
  
CM received a message Marilia patient's sister of SNF [facility] she requested [facility] [phone] fax [phone] this facility 
in [place]. CM will send a referral. and follow up.
__________________________________________________
ENTERED DATE:  21   6:20 CT
COMMENT TYPE:  Discharge Planning
REVIEWER: Kassidy Ceballos
LATE ENTRY  21  
  
CM spoke  with patient's sister Marilia Forbes [phone]  to complete 
initial dc planning assessment.  Patient lives at home alone.. Patient is 
independent. At discharge patient will require placement to a SNF. CM 
discussed availability of home health, rehab services, and medical equipment.
  CM spoke to Marilia about SNF and she was going to look them up on website 
and speak with siblings and call CM back with [facility], CM will continue to 
follow and will assist as needed with dc plans/needs.
 
 
DCP REVIEW SUMMARY
ANTICIPATED D/C DATE: 
EXPECTED LOS : 
CASE STATUS:  DCP Initiated
INITIAL REVIEW:  3/29/2021
INITIAL REVIEWER:   Kassidy Ceballos
FINAL DISCHARGE DISPOSITION:  : 
FINAL REVIEWER:  
FINAL REVIEW DATE: 
 
  
 
DCP Focus Questions & Answers
 
DCP Screen
QUESTION: ANSWER
High Risk Factors: : Hosp related to CHF, COPD, DM, End Stage Ds, CVA, CA
 
 
 
 
DCP Evaluation
QUESTION: ANSWER
Patient and/or caregiver agree upon recommended discharge plan? : Yes
Patient's current cognitive status: : Confused
Patient's ability to cope with chronic illness : d. No chronic illness
Patient gives permission to discuss discharge plans with:  (name, 
relationship and number) : MARILIA FORBES [phone]   TASIA KLEIN 
[phone]
Family / Caregiver's ability to cope with chronic illness: : b. Minimal 
(occasionally not dependable to meet pt's. needs, can meet pt's. basic 
ADL's)
Alternate discharge plan (if recommended plan not agreed upon by patient 
and/or caregiver): : SNF -PLACEMENT
Does the patient have the ability to pay for or attain post discharge needs 
/ services? : Yes
Functional screen assessment: : Noticeable poor ADL management
Family / Caregiver's ability to cope with chronic illness: : b. Minimal 
(occasionally not dependable to meet pt's. needs, can meet pt's. basic 
ADL's)
Physical Status: : Independent with ADL's
Is there a likelihood that the patient will require additional services to 
return to the preadmission environment? : Yes
Living Arrangements: : Home Alone with Support
Results of this evaluation have been discussed with: : Family
Patient with capacity for self-care or can be cared for in same environment 
as prior to hospitalization? : No
Baseline cognitive status: : *Oriented to person, place, situation, time and 
present
Physical environment modification needed / anticipated for discharge: : Yes
Planned post hospital services available for patient? : Yes
Planned post hospital services covered by insurance plan? : Yes
Does Patient have transportation to get home and to follow-up medical 
appointments when discharged from the hospital? : Yes
Would patient like to participate in any Care Coordination programs (if 
applicable): : Not applicable
Does the patient have electricity at home? : Yes
Does the patient have running water in their house? : Yes
Mental health screen: : No mental health history
Resources / Services in place: : None
 
 
 
DCP Re-evaluation
QUESTION: ANSWER
Would patient like to participate in any Care Coordination programs (if 
applicable): : Not applicable
 
 
 
 
 
 
 
 
PATIENT:  CHRIS HAYDEN
ENCOUNTER:  [id number]
MEDICAL RECORD#:  [id number]
ADMISSION DATE:  2021
DISCHARGE DATE:  
ATTENDING MD:  JEROME MCCALL
:   
AGE:  70
MARITAL STATUS:   S
DC PLAN ID:  [id number]
FACILITY:   [facility]
PRINTED ON: 4/15/21   1:53  CT
 
 
 
 
 
 
 
 
**All edits/amendments must be made on the electronic document**
 
DICTATION DATE: 04/15/21 0153     : MIKEL  04/15/21 0153     
RPT#: [id number]                                DC DATE:        
                                               STATUS: ADM IN  
[facility]
 [place] [address]
***END OF REPORT***

## 2021-04-15 NOTE — MORECARE
CASE MANAGEMENT DISCHARGE SUMMARY
 
 
PATIENT: CHRIS HAYDEN                         UNIT: [id number]
ACCOUNT#: [id number]                       ADM DATE: 21
AGE: 70     : 54  SEX: M            ROOM/BED: D.2108    
AUTHOR: TITI,DOC                             PHYSICIAN:                               
 
REFERRING PHYSICIAN: JEROME NIEVES MD                
DATE OF SERVICE: 04/15/21
Case Management Discharge Planning Summary
 
 
COMMENTS 
ENTERED DATE:  4/15/21  12:40 CT
COMMENT TYPE:  Discharge Planning
REVIEWER: Natali Becerra
Received a call from Samaria that they are ready for patient and to have 
ambulance . He is going to [facility], [facility],
 bed #10. Report called to [phone] or [phone]. DC to a skilled bed. I 
have notified his sister. Nurse to call sister when ambulance arrives. I 
have notified nurse, Maria Esther of all of the above.
__________________________________________________
ENTERED DATE:  4/15/21  11:49 CT
COMMENT TYPE:  Discharge Planning
REVIEWER: Natali Becerra
DC today to [place] to a skilled bed via ambulance.   
Address: [address] [place], [place] [id number]  
Accepting Dr Giovanni Helm
__________________________________________________
ENTERED DATE:  4/15/21  10:17 CT
COMMENT TYPE:  Discharge Planning
REVIEWER: Natalijess Becerra
Samaria with [facility] called and states they will accept 
patient to a skilled bed. He will need to transfer via non urgent ambulance. 
I faxed updated clinical. She will review and call me when ready for him. I 
informed Catie Thomas.
__________________________________________________
ENTERED DATE:  4/15/21   1:26 CT
COMMENT TYPE:  Discharge Planning
REVIEWER: Kassidy Ceballos
LATE ENTRY 21  
  
CM called [facility] and left message with Vickie Milan regarding 
possible acceptance on 21  
  
CM called twice and left message with Vickie Milan at [facility]. CM 
 
called back third time and left a message with Samaria Hassan.  CM never 
received a return callback. CM will continue to follow and assist as needed.
__________________________________________________
ENTERED DATE:  21  14:29 CT
COMMENT TYPE:  Discharge Planning
REVIEWER: Natali Morillorachel
Patient's daughter calls back and declines another choice at this time. She 
wants to see if patient can be discharged Wednesday to [facility] 
in [place]. I have informed Bibi.
__________________________________________________
ENTERED DATE:  21  11:51 CT
COMMENT TYPE:  Discharge Planning
REVIEWER: Natali Becerra CM [facility] in [place] and Samaria 
states they will not have a bed until at lease Wednesday. I called patient's 
sister to inform her and left a message on her answering machine to return 
my call. Samaria states that they have sister facilities in [facility] and [facility] that she could refer to if the sister 
desires. I will speak with the sister about another choice for SNF.
__________________________________________________
ENTERED DATE:  21   9:57 CT
COMMENT TYPE:  Discharge Planning
REVIEWER: Natali Becerra CM spoke with Samaria with [facility] in [place] and I faxed 
clinical per request. CM will continue to follow and assist with discharge 
planning/needs.
__________________________________________________
ENTERED DATE:  21   9:30 CT
COMMENT TYPE:  Discharge Planning
REVIEWER: Natali Becerra
CM called [facility] in [place] and spoke with Seema. She 
states she will tell Samaria and provide her my number. CM will continue to 
follow and assist with discharge planning/needs.
__________________________________________________
ENTERED DATE:  21   6:31 CT
COMMENT TYPE:  Discharge Planning
REVIEWER: Kassidy Ceballos
Late Entry 21  
  
CM received a message Marilia patient's sister of SNF [facility] she requested [facility] [place] [phone] fax [phone] this facility 
in [place]. CM will send a referral. and follow up.
__________________________________________________
ENTERED DATE:  21   6:20 CT
COMMENT TYPE:  Discharge Planning
REVIEWER: Kassidy Ceballos
LATE ENTRY  21  
  
CM spoke  with patient's sister Marilia Forbes [phone]  to complete 
initial dc planning assessment.  Patient lives at home alone.. Patient is 
independent. At discharge patient will require placement to a SNF. CM 
discussed availability of home health, rehab services, and medical equipment.
 
  CM spoke to Marilia about SNF and she was going to look them up on website 
and speak with siblings and call CM back with [facility], CM will continue to 
follow and will assist as needed with dc plans/needs.
 
 
DCP REVIEW SUMMARY
ANTICIPATED D/C DATE: 
EXPECTED LOS : 
CASE STATUS:  DCP Initiated
INITIAL REVIEW:  3/29/2021
INITIAL REVIEWER:   Kassidy Ceballos
FINAL DISCHARGE DISPOSITION:  : 
FINAL REVIEWER:  
FINAL REVIEW DATE: 
 
  
 
DCP Focus Questions & Answers
 
DCP Screen
QUESTION: ANSWER
High Risk Factors: : Hosp related to CHF, COPD, DM, End Stage Ds, CVA, CA
 
 
 
DCP Evaluation
QUESTION: ANSWER
Patient and/or caregiver agree upon recommended discharge plan? : Yes
Family / Caregiver's ability to cope with chronic illness: : b. Minimal 
(occasionally not dependable to meet pt's. needs, can meet pt's. basic 
ADL's)
Patient's current cognitive status: : Confused
Patient gives permission to discuss discharge plans with:  (name, 
relationship and number) : MARILIA FORBES [phone]   TASIA KLEIN 
[phone]
Patient's ability to cope with chronic illness : d. No chronic illness
Alternate discharge plan (if recommended plan not agreed upon by patient 
and/or caregiver): : SNF -PLACEMENT
Does the patient have the ability to pay for or attain post discharge needs 
/ services? : Yes
Functional screen assessment: : Noticeable poor ADL management
Family / Caregiver's ability to cope with chronic illness: : b. Minimal 
(occasionally not dependable to meet pt's. needs, can meet pt's. basic 
ADL's)
Physical Status: : Independent with ADL's
Is there a likelihood that the patient will require additional services to 
return to the preadmission environment? : Yes
Living Arrangements: : Home Alone with Support
Results of this evaluation have been discussed with: : Family
Patient with capacity for self-care or can be cared for in same environment 
as prior to hospitalization? : No
Baseline cognitive status: : *Oriented to person, place, situation, time and 
 
present
Physical environment modification needed / anticipated for discharge: : Yes
Planned post hospital services available for patient? : Yes
Planned post hospital services covered by insurance plan? : Yes
Does Patient have transportation to get home and to follow-up medical 
appointments when discharged from the hospital? : Yes
Would patient like to participate in any Care Coordination programs (if 
applicable): : Not applicable
Does the patient have electricity at home? : Yes
Does the patient have running water in their house? : Yes
Mental health screen: : No mental health history
Resources / Services in place: : None
 
 
 
DCP Re-evaluation
QUESTION: ANSWER
Would patient like to participate in any Care Coordination programs (if 
applicable): : Not applicable
 
 
 
 
 
 
 
PATIENT:  CHRIS HAYDEN
ENCOUNTER:  [id number]
MEDICAL RECORD#:  [id number]
ADMISSION DATE:  2021
DISCHARGE DATE:  
ATTENDING MD:  JEROME MCCALL
:   
AGE:  70
MARITAL STATUS:   S
DC PLAN ID:  [id number]
 
FACILITY:   [facility]
PRINTED ON: 4/15/21  12:47  CT
 
 
 
 
 
 
 
 
**All edits/amendments must be made on the electronic document**
 
DICTATION DATE: 04/15/21 1247     : IMKEL  04/15/21 1247     
RPT#: [id number]                                DC DATE:        
                                               STATUS: ADM IN  
[facility]
 [place] [address]
***END OF REPORT***

## 2021-04-16 NOTE — MORECARE
CASE MANAGEMENT DISCHARGE SUMMARY
 
 
PATIENT: CHRIS HAYDEN                         UNIT: [id number]
ACCOUNT#: [id number]                       ADM DATE: 21
AGE: 70     : 54  SEX: M            ROOM/BED: D.2108    
AUTHOR: TITI,DOC                             PHYSICIAN:                               
 
REFERRING PHYSICIAN: JEROME NIEVES MD                
DATE OF SERVICE: 21
Case Management Discharge Planning Summary
 
 
COMMENTS 
ENTERED DATE:  4/15/21  12:40 CT
COMMENT TYPE:  Discharge Planning
REVIEWER: Natali Becerra
Received a call from [facility] that they are ready for patient and to have 
ambulance . He is going to [facility], [facility],
 bed #10. Report called to [phone] or [phone]. DC to a skilled bed. I 
have notified his sister. Nurse to call sister when ambulance arrives. I 
have notified nurse, Maria Esther of all of the above.
__________________________________________________
ENTERED DATE:  4/15/21  11:49 CT
COMMENT TYPE:  Discharge Planning
REVIEWER: Natali Becerra
DC today to [facility] to a skilled bed via ambulance.   
Address: [address] [place], [place] [id number]  
Accepting Dr Giovanni Helm
__________________________________________________
ENTERED DATE:  4/15/21  10:17 CT
COMMENT TYPE:  Discharge Planning
REVIEWER: Natalijess Becerra
Samaria with [facility] called and states they will accept 
patient to a skilled bed. He will need to transfer via non urgent ambulance. 
I faxed updated clinical. She will review and call me when ready for him. I 
informed Catie Thomas.
__________________________________________________
ENTERED DATE:  4/15/21   1:26 CT
COMMENT TYPE:  Discharge Planning
REVIEWER: Kassidy Ceballos
LATE ENTRY 21  
  
CM called [facility] and left message with Vickie Milan regarding 
possible acceptance on 21  
  
CM called twice and left message with Vickie Milan at [facility]. CM 
 
called back third time and left a message with Saamria Hassan.  CM never 
received a return callback. CM will continue to follow and assist as needed.
__________________________________________________
ENTERED DATE:  21  14:29 CT
COMMENT TYPE:  Discharge Planning
REVIEWER: Natali Morillorachel
Patient's daughter calls back and declines another choice at this time. She 
wants to see if patient can be discharged Wednesday to [facility] 
in [place]. I have informed Bibi.
__________________________________________________
ENTERED DATE:  21  11:51 CT
COMMENT TYPE:  Discharge Planning
REVIEWER: Natali Becerra CM spoke with Samaria with [facility] in [place] and Samaria 
states they will not have a bed until at lease Wednesday. I called patient's 
sister to inform her and left a message on her answering machine to return 
my call. Samaria states that they have sister facilities in [facility] and [facility] that she could refer to if the sister 
desires. I will speak with the sister about another choice for SNF.
__________________________________________________
ENTERED DATE:  21   9:57 CT
COMMENT TYPE:  Discharge Planning
REVIEWER: Natali Becerra CM spoke with Samaria with [facility] in [place] and I faxed 
clinical per request. CM will continue to follow and assist with discharge 
planning/needs.
__________________________________________________
ENTERED DATE:  21   9:30 CT
COMMENT TYPE:  Discharge Planning
REVIEWER: Natali Becerra
CM called [facility] in [place] and spoke with Seema. She 
states she will tell Samaria and provide her my number. CM will continue to 
follow and assist with discharge planning/needs.
__________________________________________________
ENTERED DATE:  21   6:31 CT
COMMENT TYPE:  Discharge Planning
REVIEWER: Kassidy Ceballos
Late Entry 21  
  
CM received a message Marilia patient's sister of SNF MO[facility] she requested [facility] [place] [phone] fax [phone] this facility 
in [place]. CM will send a referral. and follow up.
__________________________________________________
ENTERED DATE:  21   6:20 CT
COMMENT TYPE:  Discharge Planning
REVIEWER: Kassidy Ceballos
LATE ENTRY  21  
  
CM spoke  with patient's sister Marilia Forbes [phone]  to complete 
initial dc planning assessment.  Patient lives at home alone.. Patient is 
independent. At discharge patient will require placement to a SNF. CM 
discussed availability of home health, rehab services, and medical equipment.
 
  CM spoke to Marilia about SNF and she was going to look them up on website 
and speak with siblings and call CM back with [facility], CM will continue to 
follow and will assist as needed with dc plans/needs.
 
 
DCP REVIEW SUMMARY
ANTICIPATED D/C DATE: 
EXPECTED LOS : 
CASE STATUS:  DCP Initiated
INITIAL REVIEW:  3/29/2021
INITIAL REVIEWER:   Kassidy Ceballos
FINAL DISCHARGE DISPOSITION:  : 
FINAL REVIEWER:  
FINAL REVIEW DATE: 
 
  
 
DCP Focus Questions & Answers
 
DCP Screen
QUESTION: ANSWER
High Risk Factors: : Hosp related to CHF, COPD, DM, End Stage Ds, CVA, CA
 
 
 
DCP Evaluation
QUESTION: ANSWER
Patient and/or caregiver agree upon recommended discharge plan? : Yes
Patient's current cognitive status: : Confused
Patient's ability to cope with chronic illness : d. No chronic illness
Patient gives permission to discuss discharge plans with:  (name, 
relationship and number) : MARILIA FORBES [phone]   TASIA KLEIN 
[phone]
Family / Caregiver's ability to cope with chronic illness: : b. Minimal 
(occasionally not dependable to meet pt's. needs, can meet pt's. basic 
ADL's)
Alternate discharge plan (if recommended plan not agreed upon by patient 
and/or caregiver): : SNF -PLACEMENT
Does the patient have the ability to pay for or attain post discharge needs 
/ services? : Yes
Functional screen assessment: : Noticeable poor ADL management
Family / Caregiver's ability to cope with chronic illness: : b. Minimal 
(occasionally not dependable to meet pt's. needs, can meet pt's. basic 
ADL's)
Physical Status: : Independent with ADL's
Is there a likelihood that the patient will require additional services to 
return to the preadmission environment? : Yes
Living Arrangements: : Home Alone with Support
Results of this evaluation have been discussed with: : Family
Patient with capacity for self-care or can be cared for in same environment 
as prior to hospitalization? : No
Baseline cognitive status: : *Oriented to person, place, situation, time and 
 
present
Physical environment modification needed / anticipated for discharge: : Yes
Planned post hospital services available for patient? : Yes
Planned post hospital services covered by insurance plan? : Yes
Does Patient have transportation to get home and to follow-up medical 
appointments when discharged from the hospital? : Yes
Would patient like to participate in any Care Coordination programs (if 
applicable): : Not applicable
Does the patient have electricity at home? : Yes
Does the patient have running water in their house? : Yes
Mental health screen: : No mental health history
Resources / Services in place: : None
 
 
 
DCP Re-evaluation
QUESTION: ANSWER
Would patient like to participate in any Care Coordination programs (if 
applicable): : Not applicable
 
 
 
 
 
 
 
PATIENT:  CHRIS HAYDEN
ENCOUNTER:  [id number]
MEDICAL RECORD#:  [id number]
ADMISSION DATE:  2021
DISCHARGE DATE:  04/15/2021
ATTENDING MD:  JEROME MCCALL
:   
AGE:  70
MARITAL STATUS:   S
DC PLAN ID:  [id number]
 
FACILITY:   [facility]
PRINTED ON: 21  19:45  CT
 
 
 
 
 
 
 
 
**All edits/amendments must be made on the electronic document**
 
DICTATION DATE: 21     : MIKEL  21     
RPT#: [id number]                                DC DATE:04/15/21
                                               STATUS: DIS IN  
[facility]
 [place] [address]
***END OF REPORT***

## 2021-04-16 NOTE — MORECARE
CASE MANAGEMENT DISCHARGE SUMMARY
 
 
PATIENT: CHRIS HAYDEN                         UNIT: [id number]
ACCOUNT#: [id number]                       ADM DATE: 21
AGE: 70     : 54  SEX: M            ROOM/BED: D.2108    
AUTHOR: TITI,DOC                             PHYSICIAN:                               
 
REFERRING PHYSICIAN: JEROME NIEVES MD                
DATE OF SERVICE: 21
Case Management Discharge Planning Summary
 
 
COMMENTS 
ENTERED DATE:  4/15/21  12:40 CT
COMMENT TYPE:  Discharge Planning
REVIEWER: Natali Becerra
Received a call from [facility] that they are ready for patient and to have 
ambulance . He is going to [facility], [facility],
 bed #10. Report called to [phone] or [phone]. DC to a skilled bed. I 
have notified his sister. Nurse to call sister when ambulance arrives. I 
have notified nurse, Maria Esther of all of the above.
__________________________________________________
ENTERED DATE:  4/15/21  11:49 CT
COMMENT TYPE:  Discharge Planning
REVIEWER: Natali Becerra
DC today to [facility] to a skilled bed via ambulance.   
Address: [address] [place], [place] [id number]  
Accepting Dr Giovanni Helm
__________________________________________________
ENTERED DATE:  4/15/21  10:17 CT
COMMENT TYPE:  Discharge Planning
REVIEWER: Natalijess Becerra
Samaria with [facility] called and states they will accept 
patient to a skilled bed. He will need to transfer via non urgent ambulance. 
I faxed updated clinical. She will review and call me when ready for him. I 
informed Catie Thomas.
__________________________________________________
ENTERED DATE:  4/15/21   1:26 CT
COMMENT TYPE:  Discharge Planning
REVIEWER: Kassidy Ceballos
LATE ENTRY 21  
  
CM called [facility] and left message with Vickie Milan regarding 
possible acceptance on 21  
  
CM called twice and left message with Vickie Milan at [facility]. CM 
 
called back third time and left a message with Samaria Hassan.  CM never 
received a return callback. CM will continue to follow and assist as needed.
__________________________________________________
ENTERED DATE:  21  14:29 CT
COMMENT TYPE:  Discharge Planning
REVIEWER: Natali Morillorachel
Patient's daughter calls back and declines another choice at this time. She 
wants to see if patient can be discharged Wednesday to [facility] 
in [place]. I have informed Bibi.
__________________________________________________
ENTERED DATE:  21  11:51 CT
COMMENT TYPE:  Discharge Planning
REVIEWER: Natali Becerra CM spoke with Samaria with [facility] in [place] and Samaria 
states they will not have a bed until at lease Wednesday. I called patient's 
sister to inform her and left a message on her answering machine to return 
my call. Samaria states that they have sister facilities in [facility] and [facility] that she could refer to if the sister 
desires. I will speak with the sister about another choice for SNF.
__________________________________________________
ENTERED DATE:  21   9:57 CT
COMMENT TYPE:  Discharge Planning
REVIEWER: Natali Becerra CM spoke with Samaria with [facility] in [place] and I faxed 
clinical per request. CM will continue to follow and assist with discharge 
planning/needs.
__________________________________________________
ENTERED DATE:  21   9:30 CT
COMMENT TYPE:  Discharge Planning
REVIEWER: Natali Becerra
CM called [facility] in [place] and spoke with Seema. She 
states she will tell Samaria and provide her my number. CM will continue to 
follow and assist with discharge planning/needs.
__________________________________________________
ENTERED DATE:  21   6:31 CT
COMMENT TYPE:  Discharge Planning
REVIEWER: Kassidy Ceballos
Late Entry 21  
  
CM received a message Marilia patient's sister of SNF MODE she requested [facility] [place] [phone] fax [phone] this facility 
in [place]. CM will send a referral. and follow up.
__________________________________________________
ENTERED DATE:  21   6:20 CT
COMMENT TYPE:  Discharge Planning
REVIEWER: Kassidy Ceballos
LATE ENTRY  21  
  
CM spoke  with patient's sister Marilia Forbes [phone]  to complete 
initial dc planning assessment.  Patient lives at home alone.. Patient is 
independent. At discharge patient will require placement to a SNF. CM 
discussed availability of home health, rehab services, and medical equipment.
 
  CM spoke to Marilia about SNF and she was going to look them up on website 
and speak with siblings and call CM back with MO[facility], CM will continue to 
follow and will assist as needed with dc plans/needs.
 
 
DCP REVIEW SUMMARY
ANTICIPATED D/C DATE: 
EXPECTED LOS : 
CASE STATUS:  DCP Initiated
INITIAL REVIEW:  3/29/2021
INITIAL REVIEWER:   Kassidy Ceballos
FINAL DISCHARGE DISPOSITION:  : 
FINAL REVIEWER:  
FINAL REVIEW DATE: 
 
  
 
DCP Focus Questions & Answers
 
DCP Screen
QUESTION: ANSWER
High Risk Factors: : Hosp related to CHF, COPD, DM, End Stage Ds, CVA, CA
 
 
 
DCP Evaluation
QUESTION: ANSWER
Patient and/or caregiver agree upon recommended discharge plan? : Yes
Patient's current cognitive status: : Confused
Patient's ability to cope with chronic illness : d. No chronic illness
Patient gives permission to discuss discharge plans with:  (name, 
relationship and number) : MARILIA FORBES [phone]   TASIA KLEIN 
[phone]
Family / Caregiver's ability to cope with chronic illness: : b. Minimal 
(occasionally not dependable to meet pt's. needs, can meet pt's. basic 
ADL's)
Alternate discharge plan (if recommended plan not agreed upon by patient 
and/or caregiver): : SNF -PLACEMENT
Does the patient have the ability to pay for or attain post discharge needs 
/ services? : Yes
Functional screen assessment: : Noticeable poor ADL management
Family / Caregiver's ability to cope with chronic illness: : b. Minimal 
(occasionally not dependable to meet pt's. needs, can meet pt's. basic 
ADL's)
Physical Status: : Independent with ADL's
Is there a likelihood that the patient will require additional services to 
return to the preadmission environment? : Yes
Living Arrangements: : Home Alone with Support
Results of this evaluation have been discussed with: : Family
Patient with capacity for self-care or can be cared for in same environment 
as prior to hospitalization? : No
Baseline cognitive status: : *Oriented to person, place, situation, time and 
 
present
Physical environment modification needed / anticipated for discharge: : Yes
Planned post hospital services available for patient? : Yes
Planned post hospital services covered by insurance plan? : Yes
Does Patient have transportation to get home and to follow-up medical 
appointments when discharged from the hospital? : Yes
Would patient like to participate in any Care Coordination programs (if 
applicable): : Not applicable
Does the patient have electricity at home? : Yes
Does the patient have running water in their house? : Yes
Mental health screen: : No mental health history
Resources / Services in place: : None
 
 
 
DCP Re-evaluation
QUESTION: ANSWER
Would patient like to participate in any Care Coordination programs (if 
applicable): : Not applicable
 
 
 
 
 
 
 
PATIENT:  CHRIS HAYDEN
ENCOUNTER:  [id number]
MEDICAL RECORD#:  [id number]
ADMISSION DATE:  2021
DISCHARGE DATE:  04/15/2021
ATTENDING MD:  JEROME MCCALL
:   
AGE:  70
MARITAL STATUS:   S
DC PLAN ID:  [id number]
 
FACILITY:   [facility]
PRINTED ON: 21   9:22  CT
 
 
 
 
 
 
 
 
**All edits/amendments must be made on the electronic document**
 
DICTATION DATE: 21     : MIKEL  21     
RPT#: [id number]                                DC DATE:04/15/21
                                               STATUS: DIS IN  
[facility]
 [place] [address]
***END OF REPORT***